# Patient Record
Sex: FEMALE | Race: BLACK OR AFRICAN AMERICAN | NOT HISPANIC OR LATINO | URBAN - METROPOLITAN AREA
[De-identification: names, ages, dates, MRNs, and addresses within clinical notes are randomized per-mention and may not be internally consistent; named-entity substitution may affect disease eponyms.]

---

## 2020-07-06 ENCOUNTER — INPATIENT (INPATIENT)
Facility: HOSPITAL | Age: 45
LOS: 6 days | Discharge: HOME | End: 2020-07-13
Attending: OBSTETRICS & GYNECOLOGY | Admitting: OBSTETRICS & GYNECOLOGY
Payer: MEDICAID

## 2020-07-06 VITALS
OXYGEN SATURATION: 99 % | TEMPERATURE: 99 F | DIASTOLIC BLOOD PRESSURE: 107 MMHG | HEART RATE: 119 BPM | SYSTOLIC BLOOD PRESSURE: 156 MMHG | RESPIRATION RATE: 16 BRPM

## 2020-07-06 LAB
ALBUMIN SERPL ELPH-MCNC: 4.4 G/DL — SIGNIFICANT CHANGE UP (ref 3.3–5)
ALP SERPL-CCNC: 56 U/L — SIGNIFICANT CHANGE UP (ref 40–120)
ALT FLD-CCNC: 12 U/L — SIGNIFICANT CHANGE UP (ref 4–33)
ANION GAP SERPL CALC-SCNC: 13 MMO/L — SIGNIFICANT CHANGE UP (ref 7–14)
APPEARANCE UR: CLEAR — SIGNIFICANT CHANGE UP
AST SERPL-CCNC: 20 U/L — SIGNIFICANT CHANGE UP (ref 4–32)
BACTERIA # UR AUTO: NEGATIVE — SIGNIFICANT CHANGE UP
BASOPHILS # BLD AUTO: 0.04 K/UL — SIGNIFICANT CHANGE UP (ref 0–0.2)
BASOPHILS NFR BLD AUTO: 0.5 % — SIGNIFICANT CHANGE UP (ref 0–2)
BILIRUB SERPL-MCNC: 0.3 MG/DL — SIGNIFICANT CHANGE UP (ref 0.2–1.2)
BILIRUB UR-MCNC: NEGATIVE — SIGNIFICANT CHANGE UP
BLOOD UR QL VISUAL: NEGATIVE — SIGNIFICANT CHANGE UP
BUN SERPL-MCNC: 27 MG/DL — HIGH (ref 7–23)
CALCIUM SERPL-MCNC: 10.1 MG/DL — SIGNIFICANT CHANGE UP (ref 8.4–10.5)
CHLORIDE SERPL-SCNC: 104 MMOL/L — SIGNIFICANT CHANGE UP (ref 98–107)
CO2 SERPL-SCNC: 21 MMOL/L — LOW (ref 22–31)
COLOR SPEC: COLORLESS — SIGNIFICANT CHANGE UP
CREAT SERPL-MCNC: 2.36 MG/DL — HIGH (ref 0.5–1.3)
EOSINOPHIL # BLD AUTO: 0.24 K/UL — SIGNIFICANT CHANGE UP (ref 0–0.5)
EOSINOPHIL NFR BLD AUTO: 3.2 % — SIGNIFICANT CHANGE UP (ref 0–6)
GLUCOSE SERPL-MCNC: 89 MG/DL — SIGNIFICANT CHANGE UP (ref 70–99)
GLUCOSE UR-MCNC: NEGATIVE — SIGNIFICANT CHANGE UP
HCT VFR BLD CALC: 31.4 % — LOW (ref 34.5–45)
HGB BLD-MCNC: 10 G/DL — LOW (ref 11.5–15.5)
HYALINE CASTS # UR AUTO: NEGATIVE — SIGNIFICANT CHANGE UP
IMM GRANULOCYTES NFR BLD AUTO: 0.3 % — SIGNIFICANT CHANGE UP (ref 0–1.5)
KETONES UR-MCNC: NEGATIVE — SIGNIFICANT CHANGE UP
LEUKOCYTE ESTERASE UR-ACNC: SIGNIFICANT CHANGE UP
LYMPHOCYTES # BLD AUTO: 2.08 K/UL — SIGNIFICANT CHANGE UP (ref 1–3.3)
LYMPHOCYTES # BLD AUTO: 28 % — SIGNIFICANT CHANGE UP (ref 13–44)
MAGNESIUM SERPL-MCNC: 1.7 MG/DL — SIGNIFICANT CHANGE UP (ref 1.6–2.6)
MCHC RBC-ENTMCNC: 24.8 PG — LOW (ref 27–34)
MCHC RBC-ENTMCNC: 31.8 % — LOW (ref 32–36)
MCV RBC AUTO: 77.7 FL — LOW (ref 80–100)
MONOCYTES # BLD AUTO: 0.63 K/UL — SIGNIFICANT CHANGE UP (ref 0–0.9)
MONOCYTES NFR BLD AUTO: 8.5 % — SIGNIFICANT CHANGE UP (ref 2–14)
NEUTROPHILS # BLD AUTO: 4.41 K/UL — SIGNIFICANT CHANGE UP (ref 1.8–7.4)
NEUTROPHILS NFR BLD AUTO: 59.5 % — SIGNIFICANT CHANGE UP (ref 43–77)
NITRITE UR-MCNC: NEGATIVE — SIGNIFICANT CHANGE UP
NRBC # FLD: 0 K/UL — SIGNIFICANT CHANGE UP (ref 0–0)
PH UR: 6 — SIGNIFICANT CHANGE UP (ref 5–8)
PHOSPHATE SERPL-MCNC: 4.3 MG/DL — SIGNIFICANT CHANGE UP (ref 2.5–4.5)
PLATELET # BLD AUTO: 248 K/UL — SIGNIFICANT CHANGE UP (ref 150–400)
PMV BLD: 10.2 FL — SIGNIFICANT CHANGE UP (ref 7–13)
POTASSIUM SERPL-MCNC: 3.7 MMOL/L — SIGNIFICANT CHANGE UP (ref 3.5–5.3)
POTASSIUM SERPL-SCNC: 3.7 MMOL/L — SIGNIFICANT CHANGE UP (ref 3.5–5.3)
PROT SERPL-MCNC: 8.1 G/DL — SIGNIFICANT CHANGE UP (ref 6–8.3)
PROT UR-MCNC: NEGATIVE — SIGNIFICANT CHANGE UP
RBC # BLD: 4.04 M/UL — SIGNIFICANT CHANGE UP (ref 3.8–5.2)
RBC # FLD: 14.5 % — SIGNIFICANT CHANGE UP (ref 10.3–14.5)
RBC CASTS # UR COMP ASSIST: SIGNIFICANT CHANGE UP (ref 0–?)
SODIUM SERPL-SCNC: 138 MMOL/L — SIGNIFICANT CHANGE UP (ref 135–145)
SP GR SPEC: 1.01 — SIGNIFICANT CHANGE UP (ref 1–1.04)
SQUAMOUS # UR AUTO: SIGNIFICANT CHANGE UP
UROBILINOGEN FLD QL: NORMAL — SIGNIFICANT CHANGE UP
WBC # BLD: 7.42 K/UL — SIGNIFICANT CHANGE UP (ref 3.8–10.5)
WBC # FLD AUTO: 7.42 K/UL — SIGNIFICANT CHANGE UP (ref 3.8–10.5)
WBC UR QL: HIGH (ref 0–?)

## 2020-07-06 PROCEDURE — 76770 US EXAM ABDO BACK WALL COMP: CPT | Mod: 26

## 2020-07-06 RX ORDER — LIDOCAINE 4 G/100G
1 CREAM TOPICAL ONCE
Refills: 0 | Status: COMPLETED | OUTPATIENT
Start: 2020-07-06 | End: 2020-07-06

## 2020-07-06 RX ADMIN — LIDOCAINE 1 PATCH: 4 CREAM TOPICAL at 22:57

## 2020-07-06 NOTE — ED PROVIDER NOTE - CLINICAL SUMMARY MEDICAL DECISION MAKING FREE TEXT BOX
44F presenting from Pcp after had kidney US showing bilateral hydro in the context of 2 weeks bilat lower ext swelling. No sob, clear lungs on exam, abd with mass palpated. Vitals reassuring. Possibly fibroids vs UTI (treated 1.5 weeks ago) vs cervical ca. Low suspicion for cord compression (patient with low post void residual on POCUS), no leg weakness/ no saddle anesthesia. Will do ua, u preg, labs, kidney us, CT ab non con looking for mass.

## 2020-07-06 NOTE — ED PROVIDER NOTE - PROGRESS NOTE DETAILS
Manisha, PGY2: talked to Dr. Jones, pts Nephrologist, pts baseline Cr 1.1, was 1.8 a week ago, he requests CT ab/pelvis non-con

## 2020-07-06 NOTE — ED PROVIDER NOTE - ATTENDING CONTRIBUTION TO CARE
DR. BLOCH, ATTENDING MD-  I performed a face to face bedside interview with patient regarding history of present illness, review of symptoms and past medical history. I completed an independent physical exam.  I have discussed patient's plan of care with the resident.  Patient examined well appearing NAd HEENT nml lungs clear, abd firm , large abd mass to above umbilicus. nontender, extrem no edema no spinal tenderness neuro nml DR. BLOCH, ATTENDING MD-  I performed a face to face bedside interview with patient regarding history of present illness, review of symptoms and past medical history. I completed an independent physical exam.  I have discussed patient's plan of care with the resident.  Patient examined well appearing NAd HEENT nml lungs clear, abd firm , large abd mass to above umbilicus. nontender, extrem no edema no spinal tenderness neuro nml.

## 2020-07-06 NOTE — ED PROVIDER NOTE - CARE PLAN
Principal Discharge DX:	Acute kidney injury  Secondary Diagnosis:	Urinary retention  Secondary Diagnosis:	Fibroid

## 2020-07-06 NOTE — ED ADULT TRIAGE NOTE - CHIEF COMPLAINT QUOTE
pt sent in by nephrologist because "both of my kidneys are blocked". pt has renal ultrasound done today and was told to come to ED. Pt states she has been having difficulty urinating.

## 2020-07-06 NOTE — ED PROVIDER NOTE - OBJECTIVE STATEMENT
44F no PMH p/w bilateral lower extremity welling for the past 2 weeks associated with urinary frequency and nocturia. Diagnosed UTI 1.5 weeks ago and finished 7 days of Cephalexin, and just finished week of lasix 40mg. States at night she feels her bladder full but has to manually put pressure suprapubically to void. Had a kidney ultrasound done today showing "blocked kidneys". Patient states sometimes Denies fevers, chills, pain on urination, flank pain. Denies chest pain, palpitations, lightheadedness, sob, orthopnea. Recent pap smear normal 6 months ago. No vaginal discharge/abnormal bleeding.   Patient does endorse back pain over the last year without trauma to the area, denies leg weakness, denies saddle anesthesia, does state that sometimes she feels like it's difficult to make a bowel movement.

## 2020-07-06 NOTE — ED ADULT NURSE NOTE - OBJECTIVE STATEMENT
Raina RN: Pt presents to rm 25 AO4 ambulatory complaining of BL flank pain sent in by PMD for "kidney blockage. Pt states she had increased swelling to BL feet, went to PMD was put on 40mg Lasix x7days with no improvement, had ultrasound done and showed "both kidneys blocked." Pt was sent by PMD. Pt endorses BL flank pain but appears to be resting comfortably, 18g IV placed to R AC labs drawn and sent. Pt endorses gaining ~20lbs over past month with associated dyspnea on exertion. Pt denies any chest pain or diff breathing at this time, appears in no obvious distress and handoff report given to JANEY Dougherty. Raina RN: Pt presents to rm 25 AO4 ambulatory complaining of BL flank pain sent in by PMD for "kidney blockage. Pt states she had increased swelling to BL feet, went to PMD was put on 40mg Lasix x7days with no improvement, had ultrasound done and showed "both kidneys blocked." Pt was sent by PMD. Pt endorses BL flank pain but appears to be resting comfortably, 18g IV placed to R AC labs drawn and sent. Pt endorses gaining ~20lbs over past month with associated dyspnea on exertion. Pt denies any chest pain or diff breathing at this time, appears in no obvious distress and handoff report given to JANEY Dougherty.  Received report from PM RN. Pt is alert and oriented times four. Pt waiting for bed assignement. Pt being evaluated by GYN at this time.  No complaints at this time.    JANEY Bryant

## 2020-07-06 NOTE — ED PROVIDER NOTE - PHYSICAL EXAMINATION
GENERAL: no acute distress, non-toxic appearing  HEENT: normal conjunctiva, oral mucosa moist  CARDIAC: regular rate and regular rhythm, normal S1 and S2, no appreciable murmurs  PULM: clear to ascultation bilaterally, no appreciable crackles, rales, rhonchi, or wheezing  GI: abdomen nondistended, soft, nontender, rectal exam chaperoned by Dr. Bloch:  : no CVA tenderness, no suprapubic tenderness  NEURO: alert and oriented x 3, normal speech, moving all extremities without lateralization  MSK: bilateral lower extremity swelling to prox tib, no calf tenderness/redness  SKIN: no visible rashes  PSYCH: appropriate mood and affect GENERAL: no acute distress, non-toxic appearing  HEENT: normal conjunctiva, oral mucosa moist  CARDIAC: regular rate and regular rhythm, normal S1 and S2, no appreciable murmurs  PULM: clear to ascultation bilaterally, no appreciable crackles, rales, rhonchi, or wheezing  GI: abd slight distended with palpable mass extending to just above umbilicus  : no CVA tenderness, no suprapubic tenderness  NEURO: alert and oriented x 3, normal speech, moving all extremities without lateralization  MSK: bilateral lower extremity swelling to prox tib, no calf tenderness/redness  SKIN: no visible rashes  PSYCH: appropriate mood and affect

## 2020-07-07 DIAGNOSIS — N17.9 ACUTE KIDNEY FAILURE, UNSPECIFIED: ICD-10-CM

## 2020-07-07 DIAGNOSIS — Z29.9 ENCOUNTER FOR PROPHYLACTIC MEASURES, UNSPECIFIED: ICD-10-CM

## 2020-07-07 DIAGNOSIS — D21.9 BENIGN NEOPLASM OF CONNECTIVE AND OTHER SOFT TISSUE, UNSPECIFIED: ICD-10-CM

## 2020-07-07 LAB
ANION GAP SERPL CALC-SCNC: 18 MMO/L — HIGH (ref 7–14)
BUN SERPL-MCNC: 24 MG/DL — HIGH (ref 7–23)
CALCIUM SERPL-MCNC: 9.4 MG/DL — SIGNIFICANT CHANGE UP (ref 8.4–10.5)
CHLORIDE SERPL-SCNC: 104 MMOL/L — SIGNIFICANT CHANGE UP (ref 98–107)
CHLORIDE UR-SCNC: 45 MMOL/L — SIGNIFICANT CHANGE UP
CO2 SERPL-SCNC: 14 MMOL/L — LOW (ref 22–31)
CREAT ?TM UR-MCNC: 45.7 MG/DL — SIGNIFICANT CHANGE UP
CREAT SERPL-MCNC: 2 MG/DL — HIGH (ref 0.5–1.3)
CULTURE RESULTS: SIGNIFICANT CHANGE UP
GLUCOSE SERPL-MCNC: 74 MG/DL — SIGNIFICANT CHANGE UP (ref 70–99)
HCT VFR BLD CALC: 32.5 % — LOW (ref 34.5–45)
HGB BLD-MCNC: 10.3 G/DL — LOW (ref 11.5–15.5)
MAGNESIUM SERPL-MCNC: 1.6 MG/DL — SIGNIFICANT CHANGE UP (ref 1.6–2.6)
MCHC RBC-ENTMCNC: 25.3 PG — LOW (ref 27–34)
MCHC RBC-ENTMCNC: 31.7 % — LOW (ref 32–36)
MCV RBC AUTO: 79.9 FL — LOW (ref 80–100)
NRBC # FLD: 0 K/UL — SIGNIFICANT CHANGE UP (ref 0–0)
PHOSPHATE SERPL-MCNC: 4.3 MG/DL — SIGNIFICANT CHANGE UP (ref 2.5–4.5)
PLATELET # BLD AUTO: 264 K/UL — SIGNIFICANT CHANGE UP (ref 150–400)
PMV BLD: 10.8 FL — SIGNIFICANT CHANGE UP (ref 7–13)
POTASSIUM SERPL-MCNC: 4.7 MMOL/L — SIGNIFICANT CHANGE UP (ref 3.5–5.3)
POTASSIUM SERPL-SCNC: 4.7 MMOL/L — SIGNIFICANT CHANGE UP (ref 3.5–5.3)
POTASSIUM UR-SCNC: 14.4 MMOL/L — SIGNIFICANT CHANGE UP
RBC # BLD: 4.07 M/UL — SIGNIFICANT CHANGE UP (ref 3.8–5.2)
RBC # FLD: 14.7 % — HIGH (ref 10.3–14.5)
SARS-COV-2 IGG SERPL QL IA: NEGATIVE — SIGNIFICANT CHANGE UP
SARS-COV-2 IGM SERPL IA-ACNC: <0.1 INDEX — SIGNIFICANT CHANGE UP
SARS-COV-2 RNA SPEC QL NAA+PROBE: SIGNIFICANT CHANGE UP
SODIUM SERPL-SCNC: 136 MMOL/L — SIGNIFICANT CHANGE UP (ref 135–145)
SODIUM UR-SCNC: 61 MMOL/L — SIGNIFICANT CHANGE UP
SPECIMEN SOURCE: SIGNIFICANT CHANGE UP
WBC # BLD: 7.87 K/UL — SIGNIFICANT CHANGE UP (ref 3.8–10.5)
WBC # FLD AUTO: 7.87 K/UL — SIGNIFICANT CHANGE UP (ref 3.8–10.5)

## 2020-07-07 PROCEDURE — 74176 CT ABD & PELVIS W/O CONTRAST: CPT | Mod: 26

## 2020-07-07 PROCEDURE — 99223 1ST HOSP IP/OBS HIGH 75: CPT

## 2020-07-07 RX ORDER — SODIUM CHLORIDE 9 MG/ML
1000 INJECTION, SOLUTION INTRAVENOUS
Refills: 0 | Status: DISCONTINUED | OUTPATIENT
Start: 2020-07-08 | End: 2020-07-08

## 2020-07-07 RX ORDER — SODIUM BICARBONATE 1 MEQ/ML
650 SYRINGE (ML) INTRAVENOUS THREE TIMES A DAY
Refills: 0 | Status: DISCONTINUED | OUTPATIENT
Start: 2020-07-07 | End: 2020-07-09

## 2020-07-07 RX ADMIN — LIDOCAINE 1 PATCH: 4 CREAM TOPICAL at 14:12

## 2020-07-07 RX ADMIN — Medication 650 MILLIGRAM(S): at 22:12

## 2020-07-07 NOTE — CONSULT NOTE ADULT - ASSESSMENT
44yoF with b/l hydronephrosis, EDIE 2/2 massively enlarged uterus    -Patient obstructed w/EDIE 2/2 massively enlarged uterus  -Placement of ureteral stents highly unlikely to be successful given significant extrinsic compression from uterus  -Even if ureteral stents were placed, there is a high likelihood of early stent failure, again secondary to extrinsic compression  -Recommend IR consult for bilateral nephrostomy tube placement 44yoF with b/l hydronephrosis, EDIE 2/2 massively enlarged uterus    -Patient obstructed w/EDIE 2/2 massively enlarged uterus  -Placement of ureteral stents highly unlikely to be successful given significant extrinsic compression from uterus  -Even if ureteral stents were placed, there is a high likelihood of early stent failure, again secondary to extrinsic compression  -Recommend bilateral nephrostomy tube placement by IR  -Discussed with patient, patient not currently amenable to nephrostomy tubes  -Obstruction and need for drainage likely to resolve with treatment of fibroid uterus  -Will follow up with GYN regarding surgical plan 44yoF with b/l hydronephrosis, EDIE 2/2 massively enlarged uterus    -Patient obstructed w/EDIE 2/2 massively enlarged uterus  -Placement of ureteral stents highly unlikely to be successful given significant extrinsic compression from uterus  -Even if ureteral stents were placed, there is a high likelihood of early stent failure, again secondary to extrinsic compression  -Recommend bilateral nephrostomy tube placement by IR  -Discussed with patient, patient not currently amenable to nephrostomy tubes  -Obstruction and need for drainage likely to resolve with treatment of fibroid uterus  -Will follow up with GYN regarding surgical plan  -For now, recommend placement of Parkinson catheter to monitor UOP given bilateral obstruction

## 2020-07-07 NOTE — CONSULT NOTE ADULT - ASSESSMENT
44 year old woman with no significant pmhx who presents to the ED at request of her nephrologist for EDIE/hydronephrosis likely related to fibroid uterus.    EDIE  Scr 1.08 1 year ago  EDIE likely sec to obstruction  CT with b/l hydro  f/u  and GYN  monitor bmp  avoid nephrotoxic agents    Elevated BP  not on meds at home  currently ~ 150  monitor.   consider norvasc 5 if bp persistently >150    Acidosis  AG  ? etiology  bicarb 650 tid x 2 days  monitor 44 year old woman with no significant pmhx who presents to the ED at request of her nephrologist for EDIE/hydronephrosis likely related to fibroid uterus.    EDIE  Scr 1.08 1 year ago  EDIE likely sec to obstruction  CT with b/l hydro  f/u  and GYN  cleared from renal stand point for surgery with anesthesia   monitor bmp  avoid nephrotoxic agents    Elevated BP  not on meds at home  currently ~ 150  monitor.   consider norvasc 5 if bp persistently >150    Acidosis  AG  ? etiology  bicarb 650 tid x 2 days  monitor 44 year old woman with no significant pmhx who presents to the ED at request of her nephrologist for EDIE/hydronephrosis likely related to fibroid uterus.    EDIE  Scr 1.08 1 year ago  EDIE likely sec to obstruction  CT with b/l hydro  f/u  and GYN  cleared from renal stand point for surgery with anesthesia   monitor bmp  avoid nephrotoxic agents    Elevated BP without diagnosis of HTN   not on meds at home  currently ~ 150  monitor.   consider norvasc 5mg daily if bp persistently >150    Acidosis  AG  ? etiology  bicarb 650 tid x 2 days  monitor

## 2020-07-07 NOTE — H&P ADULT - NSHPLABSRESULTS_GEN_ALL_CORE
138  |  104  |  27<H>  ----------------------------<  89  3.7   |  21<L>  |  2.36<H>    Ca    10.1      2020 21:41  Phos  4.3     -  Mg     1.7         TPro  8.1  /  Alb  4.4  /  TBili  0.3  /  DBili  x   /  AST  20  /  ALT  12  /  AlkPhos  56  -                            10.0   7.42  )-----------( 248      ( 2020 21:41 )             31.4             LIVER FUNCTIONS - ( 2020 21:41 )  Alb: 4.4 g/dL / Pro: 8.1 g/dL / ALK PHOS: 56 u/L / ALT: 12 u/L / AST: 20 u/L / GGT: x                 Urinalysis Basic - ( 2020 21:41 )    Color: COLORLESS / Appearance: CLEAR / S.010 / pH: 6.0  Gluc: NEGATIVE / Ketone: NEGATIVE  / Bili: NEGATIVE / Urobili: NORMAL   Blood: NEGATIVE / Protein: NEGATIVE / Nitrite: NEGATIVE   Leuk Esterase: SMALL / RBC: 0-2 / WBC 6-10   Sq Epi: OCC / Non Sq Epi: x / Bacteria: NEGATIVE    CT abdomen/pelvis: IMPRESSION:   Severe bilateral hydronephrosis secondary to massively enlarged uterus, presumably related to the presence of fibroids.    US kidney and bladder: IMPRESSION:   Severe bilateral hydronephrosis.

## 2020-07-07 NOTE — CHART NOTE - NSCHARTNOTEFT_GEN_A_CORE
R3 Gyn Chart Note    Patient seen at bedside with Gyn team. Patient counseled on R/B/A to hysterectomy. Patient agrees to surgical management at this time.    - Patient consented for Total Abdominal Hysterectomy Bilateral Salpingectomy, Exam under anesthesia, removal of damaged or diseased tissue, and possible bilateral oopherectomy. All questions answered to patient's apparent satisfaction.  - Patient scheduled for OR 7/8 at 12p  - Records from outside gyn pending. Repeat call x 4 placed today to request records.  - NPO/IVF at midnight  - Hold HSQ overnight  - Nephrology and medical clearance reviewed, recs appreciated    d/w Dr. Mak Zelaya PGY3 R3 Gyn Chart Note    Patient seen at bedside with Gyn team. Patient counseled on R/B/A to hysterectomy. Patient agrees to surgical management at this time.    - Patient consented for Total Abdominal Hysterectomy Bilateral Salpingectomy, Exam under anesthesia, removal of damaged or diseased tissue, and possible bilateral oopherectomy. All questions answered to patient's apparent satisfaction.  - Patient scheduled for OR 7/8 at 12p  - Outside Gyn reviewed records verbally--PAP 8/2019 NILM, HPV 18+. Will fax copy to [facility] to place in patient's chart   - NPO/IVF at midnight  - Hold HSQ overnight  - Nephrology and medical clearance reviewed, recs appreciated    d/w Dr. Mak Zelaay PGY3

## 2020-07-07 NOTE — CONSULT NOTE ADULT - SUBJECTIVE AND OBJECTIVE BOX
MAURICIO PERLA  44y  Female [id number]    HPI: 43 yo  presenting to ED w/ EDIE secondary to fibroid uterus compressing ureters bilaterally causing hydronephrosis.  Patient noticed lower extremity edema 1 wk, was seen by her PCP who prescribed lasix and referred to nephrology.  Further nephrology workup included renal ultrasound that subsequently showed bilateral hydronephrosis w/ EDIE, and patient was referred to ED.  Pt denies no known hx of fibroids.  Describes periods as occurring regularly, last 4-5 days, using 3 pads/day. Occasionally has intermenstrual spotting.  Menses are non-painful.  Otherwise asymptomatic, denies chest pain, SOB, fever, chills, nausea/vomiting.    Name of GYN Physician: pt does not recall name    POB:  MAB x1 w/ D&C    Pgyn: Denies known fibroids, cysts, endometriosis, abnormal paps.  Hx of chlamydia at age 18.    Home meds: none    Allergies    No Known Allergies    PAST MEDICAL & SURGICAL HISTORY:  sinusitis  liposuction     Social History:  Denies smoking use, drug use, alcohol use.       Vital Signs Last 24 Hrs  T(C): 37.1 (2020 23:59), Max: 37.2 (2020 18:02)  T(F): 98.8 (2020 23:59), Max: 98.9 (2020 18:02)  HR: 86 (2020 23:59) (86 - 119)  BP: 159/97 (2020 23:59) (156/107 - 159/97)  BP(mean): --  RR: 17 (2020 23:59) (16 - 17)  SpO2: 100% (2020 23:59) (99% - 100%)    Physical Exam:   General: sitting comftorably in bed, NAD   HEENT: neck supple, full ROM  CV: RR S1S2 no m/r/g  Lungs: CTA b/l, good air flow b/l   Back: No CVA tenderness  Abd: Soft, non-tender, non-distended.  Bowel sounds present.    :  No bleeding on pad.    External labia wnl.  Bimanual exam with no cervical motion tenderness, uterus wnl, adnexa non palpable b/l.  Cervix closed vs. Cervix dilated    cm   Speculum Exam: No active bleeding from os.  Physiologic discharge.    Ext: non-tender b/l, no edema     LABS:                          10.0   7.42  )-----------( 248      ( 2020 21:41 )             31.4     07-06    138  |  104  |  27<H>  ----------------------------<  89  3.7   |  21<L>  |  2.36<H>    Ca    10.1      2020 21:41  Phos  4.3     07-  Mg     1.7     07-    TPro  8.1  /  Alb  4.4  /  TBili  0.3  /  DBili  x   /  AST  20  /  ALT  12  /  AlkPhos  56  07-06    I&O's Detail      Urinalysis Basic - ( 2020 21:41 )    Color: COLORLESS / Appearance: CLEAR / S.010 / pH: 6.0  Gluc: NEGATIVE / Ketone: NEGATIVE  / Bili: NEGATIVE / Urobili: NORMAL   Blood: NEGATIVE / Protein: NEGATIVE / Nitrite: NEGATIVE   Leuk Esterase: SMALL / RBC: 0-2 / WBC 6-10   Sq Epi: OCC / Non Sq Epi: x / Bacteria: NEGATIVE    RADIOLOGY & ADDITIONAL STUDIES:  < from: CT Abdomen and Pelvis No Cont (20 @ 01:18) >  EXAM:  CT ABDOMEN AND PELVIS        PROCEDURE DATE:  2020     INTERPRETATION:  CLINICAL INFORMATION: Bilateral hydronephrosis, masses palpated, acute kidney injury    COMPARISON: Ultrasound kidneys 2020    PROCEDURE:   CT of the Abdomen and Pelvis was performed without intravenous contrast.   Intravenous contrast: None.  Oral contrast: None.  Sagittal and coronal reformats were performed.    FINDINGS:  LOWER CHEST: Within normal limits.    LIVER: Within normal limits.  BILE DUCTS: Normal caliber.  GALLBLADDER: Within normal limits.  SPLEEN: Within normal limits.  PANCREAS: Within normal limits.  ADRENALS: Within normal limits.  KIDNEYS/URETERS/REPRODUCTIVE ORGANS: Severe bilateral hydronephrosis secondary to extensive myomatous uterus. Uterus is lobulated and measures 19.8 x 23.8 x 12.1 cm. Largest fibroid is inferior and measures approximately 11.5 x 11.4 x 11.0 cm.    BLADDER: Within normal limits.    BOWEL: No bowel obstruction. Appendix is normal.  PERITONEUM: No ascites.  VESSELS: Within normal limits.  RETROPERITONEUM/LYMPH NODES: No lymphadenopathy.    ABDOMINAL WALL: Within normal limits.  BONES: Within normal limits.    IMPRESSION:   Severe bilateral hydronephrosis secondary to massively enlarged uterus, presumably related to the presence of fibroids.    YVONNE LIGHT M.D., RADIOLOGY RESIDENT  This document has been electronically signed.  JULIÁN NAVARRETE   This document has been electronically signed. 2020  1:51AM MAURICIO PERLA  44y  Female [id number]    HPI: 43 yo  sent in to ED by her nephrologist Dr. Jones for admission for new EDIE secondary to fibroid uterus compressing ureters bilaterally causing hydronephrosis.  Pt initially presented to her PCP ~1-2 wks ago with urinary frequency/nocturia and b/l LE swelling.  Her PCP prescribed keflex for presumed UTI and lasix and referred to nephrology, Dr. Jones.  Further nephrology workup showed EDIE (Cr increased from baseline 1.1 to 1.8) and bilateral hydronephrosis seen on outpatient renal ultrasound.  In ED, CTAP shows enlarged multifibroid uterus extending to just below the sternum.  Currently pt denies pain but continues to report urinary frequency.  She denies known hx of fibroids.  Describes periods as occurring regularly, last 4-5 days, using 3 pads/day. Occasionally has intermenstrual spotting.  Menses are non-painful.  Otherwise asymptomatic, denies chest pain, SOB, fever, chills, nausea/vomiting.    Primary GYN: pt does not recall name, @ [facility]  OBH:  MAB x1 w/ D&C  GYNH: Denies known fibroids, cysts, endometriosis, abnormal paps.  Hx of chlamydia at age 18.  PMSH: sinusitis, liposuction 2018  MEDs: none  ALL: NKDA    Vital Signs Last 24 Hrs  T(C): 37.1 (2020 23:59), Max: 37.2 (2020 18:02)  T(F): 98.8 (2020 23:59), Max: 98.9 (2020 18:02)  HR: 86 (2020 23:59) (86 - 119)  BP: 159/97 (2020 23:59) (156/107 - 159/97)  RR: 17 (2020 23:59) (16 - 17)  SpO2: 100% (2020 23:59) (99% - 100%)    Physical Exam:   General: sitting comfortably in bed, NAD   CV: RRR  Lungs: CTAB  Abd: Soft, non-tender, distended with large firm pelvic mass  Pelvic: deferred (pt in hallway)  Ext: non-tender b/l, no edema     LABS:    Electrolytes, Urine (07.07.20 @ 03:10)    Sodium, Random Urine: 61: Reference range not established for this test mmol/L    Potassium, Random Urine: 14.4: Reference range not established for this test mmol/L    Chloride, Random Urine: 45: Reference range not established for this test mmol/L                     10.0   7.42  )-----------( 248      ( 2020 21:41 )             31.4     07-06    138  |  104  |  27<H>  ----------------------------<  89  3.7   |  21<L>  |  2.36<H>    Ca    10.1      2020 21:41  Phos  4.3     07-  Mg     1.7     07-06    TPro  8.1  /  Alb  4.4  /  TBili  0.3  /  DBili  x   /  AST  20  /  ALT  12  /  AlkPhos  56  07-06    Urinalysis Basic - ( 2020 21:41 )  Color: COLORLESS / Appearance: CLEAR / S.010 / pH: 6.0  Gluc: NEGATIVE / Ketone: NEGATIVE  / Bili: NEGATIVE / Urobili: NORMAL   Blood: NEGATIVE / Protein: NEGATIVE / Nitrite: NEGATIVE   Leuk Esterase: SMALL / RBC: 0-2 / WBC 6-10   Sq Epi: OCC / Non Sq Epi: x / Bacteria: NEGATIVE    RADIOLOGY & ADDITIONAL STUDIES:    < from: CT Abdomen and Pelvis No Cont (20 @ 01:18) >  EXAM:  CT ABDOMEN AND PELVIS    PROCEDURE DATE:  2020   INTERPRETATION:  CLINICAL INFORMATION: Bilateral hydronephrosis, masses palpated, acute kidney injury  COMPARISON: Ultrasound kidneys 2020  PROCEDURE:  CT of the Abdomen and Pelvis was performed without intravenous contrast.  Intravenous contrast: None. Oral contrast: None. Sagittal and coronal reformats were performed.  FINDINGS: LOWER CHEST: Within normal limits.  LIVER: Within normal limits.  BILE DUCTS: Normal caliber.  GALLBLADDER: Within normal limits.  SPLEEN: Within normal limits.  PANCREAS: Within normal limits.  ADRENALS: Within normal limits.  KIDNEYS/URETERS/REPRODUCTIVE ORGANS: Severe bilateral hydronephrosis secondary to extensive myomatous uterus. Uterus is lobulated and measures 19.8 x 23.8 x 12.1 cm. Largest fibroid is inferior and measures approximately 11.5 x 11.4 x 11.0 cm.  BLADDER: Within normal limits.  BOWEL: No bowel obstruction. Appendix is normal.  PERITONEUM: No ascites.  VESSELS: Within normal limits.  RETROPERITONEUM/LYMPH NODES: No lymphadenopathy.    ABDOMINAL WALL: Within normal limits.  BONES: Within normal limits.    IMPRESSION: Severe bilateral hydronephrosis secondary to massively enlarged uterus, presumably related to the presence of fibroids.    YVONNE LIGHT M.D., RADIOLOGY RESIDENT  This document has been electronically signed.  JULIÁN NAVARRETE   This document has been electronically signed. 2020  1:51AM

## 2020-07-07 NOTE — CONSULT NOTE ADULT - SUBJECTIVE AND OBJECTIVE BOX
HPI: 44 yoF sent in to ED by her nephrologist Dr. Jones for admission for new EDIE secondary to fibroid uterus compressing ureters bilaterally causing hydronephrosis.  Pt initially presented to her PCP ~1-2 wks ago with urinary frequency/nocturia and b/l LE swelling.  Her PCP prescribed keflex for presumed UTI and lasix and referred to nephrology, Dr. Jones.  Further nephrology workup showed EDIE (Cr increased from baseline 1.1 to 1.8) and bilateral hydronephrosis seen on outpatient renal ultrasound.      In ED, CTAP shows enlarged multifibroid uterus extending to just below the sternum.  Currently pt denies pain but continues to report urinary frequency.  She denies known hx of fibroids.  Describes periods as occurring regularly, last 4-5 days, using 3 pads/day. Occasionally has intermenstrual spotting.  Menses are non-painful.  Otherwise asymptomatic, denies chest pain, SOB, fever, chills, nausea/vomiting.    PAST MEDICAL & SURGICAL HISTORY:  No pertinent past medical history  No significant past surgical history    FAMILY HISTORY:  Family history of uterine fibroid    No Known Allergies    REVIEW OF SYSTEMS: Pertinent positives and negatives as stated in HPI, otherwise negative    Vital signs  T(C): 36.7, Max: 37.2 ( @ 18:02)  HR: 89  BP: 142/95  SpO2: 100%    Physical Exam  Incomplete    LABS:   @ 21:41    WBC 7.42  / Hct 31.4  / SCr 2.36         138  |  104  |  27<H>  ----------------------------<  89  3.7   |  21<L>  |  2.36<H>    Ca    10.1      2020 21:41  Phos  4.3     -  Mg     1.7     -    TPro  8.1  /  Alb  4.4  /  TBili  0.3  /  DBili  x   /  AST  20  /  ALT  12  /  AlkPhos  56        Urinalysis Basic - ( 2020 21:41 )  Color: COLORLESS / Appearance: CLEAR / S.010 / pH: 6.0  Gluc: NEGATIVE / Ketone: NEGATIVE  / Bili: NEGATIVE / Urobili: NORMAL   Blood: NEGATIVE / Protein: NEGATIVE / Nitrite: NEGATIVE   Leuk Esterase: SMALL / RBC: 0-2 / WBC 6-10   Sq Epi: OCC / Non Sq Epi: x / Bacteria: NEGATIVE    Urine Cx: p    RADIOLOGY:  < from: CT Abdomen and Pelvis No Cont (20 @ 01:18) >    ADRENALS: Within normal limits.  KIDNEYS/URETERS/REPRODUCTIVE ORGANS: Severe bilateral hydronephrosis secondary to extensive myomatous uterus. Uterus is lobulated and measures 19.8 x 23.8 x 12.1 cm. Largest fibroid is inferior and measures approximately 11.5 x 11.4 x 11.0 cm.  BLADDER: Within normal limits.  BOWEL: No bowel obstruction. Appendix is normal.  PERITONEUM: No ascites.  VESSELS: Within normal limits.  RETROPERITONEUM/LYMPH NODES: No lymphadenopathy.    ABDOMINAL WALL: Within normal limits.  BONES: Within normal limits.    IMPRESSION:   Severe bilateral hydronephrosis secondary to massively enlarged uterus, presumably related to the presence of fibroids. HPI: 44 yoF sent in to ED by her nephrologist Dr. Jones for admission for new EDIE secondary to fibroid uterus compressing ureters bilaterally causing hydronephrosis.  Pt initially presented to her PCP ~1-2 wks ago with urinary frequency/nocturia and b/l LE swelling.  Her PCP prescribed keflex for presumed UTI and lasix and referred to nephrology, Dr. Jones.  Further nephrology workup showed EDIE (Cr increased from baseline 1.1 to 1.8) and bilateral hydronephrosis seen on outpatient renal ultrasound.      In ED, CTAP shows enlarged multifibroid uterus extending to just below the sternum.  Currently pt denies pain but continues to report urinary frequency.  She denies known hx of fibroids.  Describes periods as occurring regularly, last 4-5 days, using 3 pads/day. Occasionally has intermenstrual spotting.  Menses are non-painful.  Otherwise asymptomatic, denies chest pain, SOB, fever, chills, nausea/vomiting.    PAST MEDICAL & SURGICAL HISTORY:  No pertinent past medical history  No significant past surgical history    FAMILY HISTORY:  Family history of uterine fibroid    No Known Allergies    REVIEW OF SYSTEMS: Pertinent positives and negatives as stated in HPI, otherwise negative    Vital signs  T(C): 36.7, Max: 37.2 ( @ 18:02)  HR: 89  BP: 142/95  SpO2: 100%    Physical Exam  Gen: NAD  Abd: Enlarged uterus  : No CVAT    LABS:   @ 21:41    WBC 7.42  / Hct 31.4  / SCr 2.36       138  |  104  |  27<H>  ----------------------------<  89  3.7   |  21<L>  |  2.36<H>    Ca    10.1      2020 21:41  Phos  4.3     -  Mg     1.7     -    TPro  8.1  /  Alb  4.4  /  TBili  0.3  /  DBili  x   /  AST  20  /  ALT  12  /  AlkPhos  56      Urinalysis Basic - ( 2020 21:41 )  Color: COLORLESS / Appearance: CLEAR / S.010 / pH: 6.0  Gluc: NEGATIVE / Ketone: NEGATIVE  / Bili: NEGATIVE / Urobili: NORMAL   Blood: NEGATIVE / Protein: NEGATIVE / Nitrite: NEGATIVE   Leuk Esterase: SMALL / RBC: 0-2 / WBC 6-10   Sq Epi: OCC / Non Sq Epi: x / Bacteria: NEGATIVE    Urine Cx: p    RADIOLOGY:  < from: CT Abdomen and Pelvis No Cont (20 @ 01:18) >    ADRENALS: Within normal limits.  KIDNEYS/URETERS/REPRODUCTIVE ORGANS: Severe bilateral hydronephrosis secondary to extensive myomatous uterus. Uterus is lobulated and measures 19.8 x 23.8 x 12.1 cm. Largest fibroid is inferior and measures approximately 11.5 x 11.4 x 11.0 cm.  BLADDER: Within normal limits.  BOWEL: No bowel obstruction. Appendix is normal.  PERITONEUM: No ascites.  VESSELS: Within normal limits.  RETROPERITONEUM/LYMPH NODES: No lymphadenopathy.    ABDOMINAL WALL: Within normal limits.  BONES: Within normal limits.    IMPRESSION:   Severe bilateral hydronephrosis secondary to massively enlarged uterus, presumably related to the presence of fibroids.

## 2020-07-07 NOTE — CONSULT NOTE ADULT - SUBJECTIVE AND OBJECTIVE BOX
[facility]   MD AUDREY TOVAR DO ANAM SIDDIQUI ANGELA WONG, PA    TEL:  OFFICE: [phone]  DR ENCARNACION CELL: [phone]  DR. JOE CELL: [phone]  DR. CHA CELL: [phone]  MARY HANSEN CELL: [phone]    From 5pm-7am answering service [phone]    HPI:  44 year old woman with no significant pmhx who presents to the ED at request of her nephrologist for EDIE/hydronephrosis likely related to fibroid uterus. patient seen at the office few weeks ago referred by PCP for EDIE scr worsen from 1.08 to 1.8 in one year. US done as noted. currently pt denied sob, cp, abd pain, n/v/d. Patient did note LE edema improves with elevated, increase frequency of urination and weight gain.       Allergies:  No Known Allergies      PAST MEDICAL & SURGICAL HISTORY:  No pertinent past medical history  No significant past surgical history      Home Medications Reviewed    Hospital Medications:   MEDICATIONS  (STANDING):      SOCIAL HISTORY:  Denies ETOh, Smoking,     FAMILY HISTORY:  Family history of uterine fibroid      REVIEW OF SYSTEMS:  CONSTITUTIONAL: No weakness, fevers or chills  EYES/ENT: No visual changes;  No vertigo or throat pain   NECK: No pain or stiffness  RESPIRATORY: No cough, wheezing, hemoptysis; No shortness of breath  CARDIOVASCULAR: No chest pain or palpitations.  GASTROINTESTINAL: No abdominal or epigastric pain. No nausea, vomiting, or hematemesis; No diarrhea or constipation. No melena or hematochezia.  GENITOURINARY: see hpi  NEUROLOGICAL: No numbness or weakness  SKIN: No itching, burning, rashes, or lesions   VASCULAR: + bilateral lower extremity edema.   All other review of systems is negative unless indicated above.    VITALS:  T(F): 98.6 (20 @ 11:29), Max: 98.9 (20 @ 18:02)  HR: 102 (20 @ :29)  BP: 156/109 (20 @ 11:29)  RR: 16 (20 @ 11:29)  SpO2: 100% (20 @ 11:29)  Wt(kg): --        PHYSICAL EXAM:  Constitutional: NAD  HEENT: anicteric sclera, oropharynx clear, MMM  Neck: No JVD  Respiratory: CTAB, no wheezes, rales or rhonchi  Cardiovascular: S1, S2, RRR  Gastrointestinal: BS+, soft, NT/ND  Extremities: trace peripheral edema  Neurological: A/O x 3, no focal deficits  Psychiatric: Normal mood, normal affect  : No CVA tenderness. No cruz.   Skin: No rashes      LABS:      136  |  104  |  24<H>  ----------------------------<  74  4.7   |  14<L>  |  2.00<H>    Ca    9.4      2020 07:00  Phos  4.3       Mg     1.6         TPro  8.1  /  Alb  4.4  /  TBili  0.3  /  DBili      /  AST  20  /  ALT  12  /  AlkPhos  56      Creatinine Trend: 2.00 <--, 2.36 <--                        10.0   7.42  )-----------( 248      ( 2020 21:41 )             31.4     Urine Studies:  Urinalysis Basic - ( 2020 21:41 )    Color: COLORLESS / Appearance: CLEAR / S.010 / pH: 6.0  Gluc: NEGATIVE / Ketone: NEGATIVE  / Bili: NEGATIVE / Urobili: NORMAL   Blood: NEGATIVE / Protein: NEGATIVE / Nitrite: NEGATIVE   Leuk Esterase: SMALL / RBC: 0-2 / WBC 6-10   Sq Epi: OCC / Non Sq Epi:  / Bacteria: NEGATIVE      Sodium, Random Urine: 61 mmol/L ( @ 03:10)  Potassium, Random Urine: 14.4 mmol/L ( @ 03:10)  Chloride, Random Urine: 45 mmol/L ( @ 03:10)  Creatinine, Random Urine: 45.70 mg/dL ( @ 03:10)      RADIOLOGY & ADDITIONAL STUDIES:

## 2020-07-07 NOTE — H&P ADULT - ASSESSMENT
44 year old woman with no significant pmhx who presents to the ED at request of her nephrologist for EDIE/hydronephrosis likely related to fibroid uterus. To be admitted for further workup

## 2020-07-07 NOTE — H&P ADULT - HISTORY OF PRESENT ILLNESS
Pt is a 44 year old woman with no significant pmhx who presents to the ED at request of her nephrologist for EDIE/hydronephrosis likely related to fibroid uterus. Pt reports for about 2 weeks having b/l LE edema along with urinary frequency. She also reports 2 months of heavier menstrual cycles with some irregularity at times. She was diagnosed with UTI and given 7 days of cephalexin which she finished. She also was given a week of lasix 40mg which she finished and said helped her edema. She then today had a kidney ultrasound done today which showed severe hydronephrosis and so she was sent to the ED. No other fever, chills, cough, chest pain, shortness of breath, abdominal pain, or dysuria/diarrhea.

## 2020-07-07 NOTE — H&P ADULT - PROBLEM SELECTOR PLAN 2
as above, seen on CT compressing b/l ureters causing hydronephrosis  -gyn consulted, recs appreciated

## 2020-07-07 NOTE — H&P ADULT - PROBLEM SELECTOR PLAN 1
EDIE likely obstructive with severe b/l hydronephosis likely 2/2 large fibroid uterus compressing ureters  -pt currently asymptomatic, gyn consulted, will plan for likely hysterectomy inpt vs outpt  -trend bmp and I/O. Will consult urology this AM for possible stent per gyn

## 2020-07-07 NOTE — CONSULT NOTE ADULT - ASSESSMENT
45 yo  sent in to ED by her nephrologist Dr. Jones for admission for new EDIE secondary to large fibroid uterus compressing ureters bilaterally causing hydronephrosis.     RECOMMENDATIONS  -agree with admission to medicine per Dr. Jones's request  -GYN Team will see pt in AM for pap/emb and surgical planning inpt vs outpt  -recommend Urology consult as pt will likely need ureteral stents    Pt seen and plan d/w Dr. Leeroy Fairbanks MD PGY4

## 2020-07-07 NOTE — H&P ADULT - PROBLEM SELECTOR PLAN 3
SCDs for now given possible procedure, regular diet SCDs for now given possible procedure, renal diet

## 2020-07-07 NOTE — H&P ADULT - NSHPPHYSICALEXAM_GEN_ALL_CORE
PHYSICAL EXAM:  GENERAL: NAD, well-developed, well-nourished  HEAD:  Atraumatic, Normocephalic  EYES: EOMI, PERRLA, conjunctiva and sclera clear  NECK: Supple, No JVD  CHEST/LUNG: Clear to auscultation bilaterally; No wheezes, rales or rhonchi  HEART: Regular rate and rhythm; No murmurs, rubs, or gallops, (+)S1, S2  ABDOMEN: Soft, +mass palpable in midabdomen, Nontender, Nondistended; Normal Bowel sounds, no cva tenderness   EXTREMITIES:  2+ Peripheral Pulses, trace pitting edema b/l  PSYCH: normal mood and affect  NEUROLOGY: AAOx3, non-focal  SKIN: No rashes or lesions

## 2020-07-08 ENCOUNTER — TRANSCRIPTION ENCOUNTER (OUTPATIENT)
Age: 45
End: 2020-07-08

## 2020-07-08 LAB
ANION GAP SERPL CALC-SCNC: 14 MMO/L — SIGNIFICANT CHANGE UP (ref 7–14)
BLD GP AB SCN SERPL QL: NEGATIVE — SIGNIFICANT CHANGE UP
BUN SERPL-MCNC: 21 MG/DL — SIGNIFICANT CHANGE UP (ref 7–23)
CALCIUM SERPL-MCNC: 9.7 MG/DL — SIGNIFICANT CHANGE UP (ref 8.4–10.5)
CHLORIDE SERPL-SCNC: 106 MMOL/L — SIGNIFICANT CHANGE UP (ref 98–107)
CO2 SERPL-SCNC: 19 MMOL/L — LOW (ref 22–31)
CREAT SERPL-MCNC: 2.17 MG/DL — HIGH (ref 0.5–1.3)
GLUCOSE BLDC GLUCOMTR-MCNC: 91 MG/DL — SIGNIFICANT CHANGE UP (ref 70–99)
GLUCOSE SERPL-MCNC: 93 MG/DL — SIGNIFICANT CHANGE UP (ref 70–99)
HCG SERPL-ACNC: < 5 MIU/ML — SIGNIFICANT CHANGE UP
HCT VFR BLD CALC: 31.7 % — LOW (ref 34.5–45)
HGB BLD-MCNC: 10 G/DL — LOW (ref 11.5–15.5)
MCHC RBC-ENTMCNC: 24.7 PG — LOW (ref 27–34)
MCHC RBC-ENTMCNC: 31.5 % — LOW (ref 32–36)
MCV RBC AUTO: 78.3 FL — LOW (ref 80–100)
NRBC # FLD: 0 K/UL — SIGNIFICANT CHANGE UP (ref 0–0)
PLATELET # BLD AUTO: 251 K/UL — SIGNIFICANT CHANGE UP (ref 150–400)
PMV BLD: 9.9 FL — SIGNIFICANT CHANGE UP (ref 7–13)
POTASSIUM SERPL-MCNC: 3.8 MMOL/L — SIGNIFICANT CHANGE UP (ref 3.5–5.3)
POTASSIUM SERPL-SCNC: 3.8 MMOL/L — SIGNIFICANT CHANGE UP (ref 3.5–5.3)
RBC # BLD: 4.05 M/UL — SIGNIFICANT CHANGE UP (ref 3.8–5.2)
RBC # FLD: 14.6 % — HIGH (ref 10.3–14.5)
RH IG SCN BLD-IMP: POSITIVE — SIGNIFICANT CHANGE UP
RH IG SCN BLD-IMP: POSITIVE — SIGNIFICANT CHANGE UP
SODIUM SERPL-SCNC: 139 MMOL/L — SIGNIFICANT CHANGE UP (ref 135–145)
WBC # BLD: 6.86 K/UL — SIGNIFICANT CHANGE UP (ref 3.8–10.5)
WBC # FLD AUTO: 6.86 K/UL — SIGNIFICANT CHANGE UP (ref 3.8–10.5)

## 2020-07-08 RX ORDER — SODIUM CHLORIDE 9 MG/ML
1000 INJECTION, SOLUTION INTRAVENOUS
Refills: 0 | Status: DISCONTINUED | OUTPATIENT
Start: 2020-07-08 | End: 2020-07-09

## 2020-07-08 NOTE — PROGRESS NOTE ADULT - ASSESSMENT
44 year old woman with no significant pmhx who presents to the ED at request of her nephrologist for EDIE/hydronephrosis likely related to fibroid uterus.    EDIE  Scr 1.08 1 year ago  EDIE likely sec to obstruction  CT with b/l hydro  f/u  and GYN  cleared from renal stand point for surgery with anesthesia,   plan for OR today  monitor bmp  avoid nephrotoxic agents    Elevated BP without diagnosis of HTN   bp now controlled  not on meds  continue to monitor    Acidosis  non AG  bicarb 650 tid x 2 days  better  monitor

## 2020-07-08 NOTE — PROGRESS NOTE ADULT - PROBLEM SELECTOR PLAN 1
EDIE likely obstructive with severe b/l hydronephrosis likely 2/2 large fibroid uterus compressing ureters  -pt currently asymptomatic, gyn consulted, plan for likely hysterectomy today  -Cr stable.  - GYN and renal eval appreciated.

## 2020-07-08 NOTE — PROGRESS NOTE ADULT - SUBJECTIVE AND OBJECTIVE BOX
Subjective    Seen and examined.  Declined nephrostomy tubes and cruz catheter yesterday.  Voiding well, no flank pain.    Objective    Vital signs  T(F): , Max: 98.7 (07-07-20 @ 21:50)  HR: 92 (07-08-20 @ 05:54)  BP: 129/79 (07-08-20 @ 05:54)  SpO2: 100% (07-08-20 @ 05:54)  Wt(kg): --    Output     OUT:    Voided: 1101 mL  Total OUT: 1101 mL    Total NET: -1101 mL          Physical Exam  Gen NAD  Abd soft NT    no cruz    Labs      07-07 @ 14:11    WBC 7.87  / Hct 32.5  / SCr --       07-07 @ 07:00    WBC --    / Hct --    / SCr 2.00       Urine Cx:   Culture - Urine (07.06.20 @ 23:52)    Specimen Source: .Urine Clean Catch (Midstream)    Culture Results:   <10,000 CFU/mL Normal Urogenital Ale    	    Imaging  < from: CT Abdomen and Pelvis No Cont (07.07.20 @ 01:18) >    EXAM:  CT ABDOMEN AND PELVIS        PROCEDURE DATE:  Jul 7 2020         INTERPRETATION:  CLINICAL INFORMATION: Bilateral hydronephrosis, masses palpated, acute kidney injury    COMPARISON: Ultrasound kidneys 7/6/2020    PROCEDURE:   CT of the Abdomen and Pelvis was performed without intravenous contrast.   Intravenous contrast: None.  Oral contrast: None.  Sagittal and coronal reformats were performed.    FINDINGS:  LOWER CHEST: Within normal limits.    LIVER: Within normal limits.  BILE DUCTS: Normal caliber.  GALLBLADDER: Within normal limits.  SPLEEN: Within normal limits.  PANCREAS: Within normal limits.  ADRENALS: Within normal limits.  KIDNEYS/URETERS/REPRODUCTIVE ORGANS: Severe bilateral hydronephrosis secondary to extensive myomatous uterus. Uterus is lobulated and measures 19.8 x 23.8 x 12.1 cm. Largest fibroid is inferior and measures approximately 11.5 x 11.4 x 11.0 cm.    BLADDER: Within normal limits.    BOWEL: No bowel obstruction. Appendix is normal.  PERITONEUM: No ascites.  VESSELS: Within normal limits.  RETROPERITONEUM/LYMPH NODES: No lymphadenopathy.    ABDOMINAL WALL: Within normal limits.  BONES: Within normal limits.    IMPRESSION:   Severe bilateral hydronephrosis secondary to massively enlarged uterus, presumably related to the presence of fibroids.              YVONNE KULDEEP M.D., RADIOLOGY RESIDENT  This document has been electronically signed.  JULIÁN NAVARRETE   This document has been electronically signed. Jul 7 2020  1:51AM                  < end of copied text >

## 2020-07-08 NOTE — PROGRESS NOTE ADULT - ASSESSMENT
A/P: 44y yo admitted with an EDIE 2/2 hydronephrosis from a large, myomatous uterus causing b/l urethral obstruction. Patient is doing well and has been counseled on her surgical plan ([facility], BLS).    Myomatous uterus causing urethral obstruction  - Plan for OR today 7/8  - NPO until surgery  - Will provide recs after surgery      to be d/w VWhite PGY-3      Carole Bush MD, MS   PGY-1 A/P: 44y yo admitted with an EDIE 2/2 hydronephrosis from a large, myomatous uterus causing b/l urethral obstruction. Patient is doing well and has been counseled on her surgical plan ([facility], BLS).    Myomatous uterus causing urethral obstruction  - Plan for OR today 7/8  - NPO until surgery  - Hold SubQ heparin   - Will provide recs after surgery      to be d/w VWhite PGY-3      Carole Bush MD, MS   PGY-1 A/P: 44y yo admitted with an EDIE 2/2 hydronephrosis from a large, myomatous uterus causing b/l ureteral obstruction. Patient is doing well and has been counseled on her surgical plan (Total Abdominal Hysterectomy, Bilateral Salpingectomy ).    Myomatous uterus causing ureteral obstruction  - Plan for OR today 7/8  - NPO/IVF  - Hold SubQ heparin   - Appreciate excellent care per primary team      d/w gyn team  Carole Bush MD, MS   PGY-1        Patient seen at bedside with PGY1  Agree with plan as above  Patient for OR today, no current complaints  PAP from outside provider received overnight, copy placed in chart (cytology neg, HPV 18+)  AM CBC reviewed, stable  BMP pending    Audrey Zelaya PGY3

## 2020-07-08 NOTE — PROGRESS NOTE ADULT - ASSESSMENT
44yoF with b/l hydronephrosis, EDIE 2/2 massively enlarged uterus    - Patient refusing cruz catheter and NTs at this time  - On schedule for abdominal hysterectomy today per patient  - Rec trend Cr  - Strict I&Os  - Discussed potential risks of refusing nephrostomy tubes and the potential need for them if Cr worsens

## 2020-07-08 NOTE — PROGRESS NOTE ADULT - SUBJECTIVE AND OBJECTIVE BOX
[facility] [place] [facility]   MD AUDREY TOVAR DO ANAM SIDDIQUI ANGELA WONG, PA    TEL:  OFFICE: [phone]  DR ENCARNACION CELL: [phone]  DR. JOE CELL: [phone]  DR. CHA CELL: [phone]  MARY HANSEN CELL: [phone]    From 5pm-7am Answering Service [phone]    Patient is a 44y old  Female who presents with a chief complaint of hydronephrosis (08 Jul 2020 06:50)      Patient seen and examined at bedside. No chest pain/sob    VITALS:  T(F): 98.3 (07-08-20 @ 05:54), Max: 98.7 (07-07-20 @ 21:50)  HR: 92 (07-08-20 @ 05:54)  BP: 129/79 (07-08-20 @ 05:54)  RR: 16 (07-08-20 @ 05:54)  SpO2: 100% (07-08-20 @ 05:54)  Wt(kg): --    07-07 @ 07:01  -  07-08 @ 07:00  --------------------------------------------------------  IN: 0 mL / OUT: 1101 mL / NET: -1101 mL      Height (cm): 170.2 (07-07 @ 18:54)  Weight (kg): 104.1 (07-07 @ 18:54)  BMI (kg/m2): 35.9 (07-07 @ 18:54)  BSA (m2): 2.14 (07-07 @ 18:54)    PHYSICAL EXAM:  Constitutional: NAD  Neck: No JVD  Respiratory: CTAB, no wheezes, rales or rhonchi  Cardiovascular: S1, S2, RRR  Gastrointestinal: BS+, soft, NT/ND  Extremities: No peripheral edema    Hospital Medications:   MEDICATIONS  (STANDING):  lactated ringers. 1000 milliLiter(s) (125 mL/Hr) IV Continuous <Continuous>  sodium bicarbonate 650 milliGRAM(s) Oral three times a day      LABS:  07-08    139  |  106  |  21  ----------------------------<  93  3.8   |  19<L>  |  2.17<H>    Ca    9.7      08 Jul 2020 06:30  Phos  4.3     07-07  Mg     1.6     07-07    TPro  8.1  /  Alb  4.4  /  TBili  0.3  /  DBili      /  AST  20  /  ALT  12  /  AlkPhos  56  07-06    Creatinine Trend: 2.17 <--, 2.00 <--, 2.36 <--                                10.0   6.86  )-----------( 251      ( 08 Jul 2020 06:30 )             31.7     Urine Studies:  Urinalysis - [07-06-20 @ 21:41]      Color COLORLESS / Appearance CLEAR / SG 1.010 / pH 6.0      Gluc NEGATIVE / Ketone NEGATIVE  / Bili NEGATIVE / Urobili NORMAL       Blood NEGATIVE / Protein NEGATIVE / Leuk Est SMALL / Nitrite NEGATIVE      RBC 0-2 / WBC 6-10 / Hyaline NEGATIVE / Gran  / Sq Epi OCC / Non Sq Epi  / Bacteria NEGATIVE    Urine Creatinine 45.70      [07-07-20 @ 03:10]  Urine Sodium 61      [07-07-20 @ 03:10]  Urine Potassium 14.4      [07-07-20 @ 03:10]  Urine Chloride 45      [07-07-20 @ 03:10]          RADIOLOGY & ADDITIONAL STUDIES:

## 2020-07-08 NOTE — PROGRESS NOTE ADULT - SUBJECTIVE AND OBJECTIVE BOX
GYN Progress Note   HD#2    Patient seen and examined at bedside.  No acute events overnight. No acute concerns. Reports no pain. Has been NPO all night for OR today.   Patient is voiding spontaneously.   All previous pap smears wnl. No family hx of cancer.   Denies CP, SOB, N/V, fevers, and chills.    Vital Signs Last 24 Hours  T(C): 36.8 (20 @ 05:54), Max: 37.1 (20 @ 21:50)  HR: 92 (20 @ 05:54) (92 - 102)  BP: 129/79 (20 @ 05:54) (129/79 - 156/109)  RR: 16 (20 @ 05:54) (16 - 18)  SpO2: 100% (20 @ 05:54) (100% - 100%)    I&O's Summary    2020 07:01  -  2020 06:50  --------------------------------------------------------  IN: 0 mL / OUT: 1101 mL / NET: -1101 mL      Labs:                        10.3   7.87  )-----------( 264      ( 2020 14:11 )             32.5   baso x      eos x      imm gran x      lymph x      mono x      poly x                            10.0   7.42  )-----------( 248      ( 2020 21:41 )             31.4   baso 0.5    eos 3.2    imm gran 0.3    lymph 28.0   mono 8.5    poly 59.5                10.3<L>  7.87  )-----------( 264      (  14:11 )             32.5<L>               10.0<L>  7.42  )-----------( 248      (  21:41 )             31.4<L>          136  |  104  |  24<H>  ----------------------------<  74  4.7   |  14<L>  |  2.00<H>    Ca    9.4      2020 07:00  Phos  4.3     07-07  Mg     1.6     07-07    TPro  8.1  /  Alb  4.4  /  TBili  0.3  /  DBili  x   /  AST  20  /  ALT  12  /  AlkPhos  56  07-06      Urinalysis Basic - ( 2020 21:41 )    Color: COLORLESS / Appearance: CLEAR / S.010 / pH: 6.0  Gluc: NEGATIVE / Ketone: NEGATIVE  / Bili: NEGATIVE / Urobili: NORMAL   Blood: NEGATIVE / Protein: NEGATIVE / Nitrite: NEGATIVE   Leuk Esterase: SMALL / RBC: 0-2 / WBC 6-10   Sq Epi: OCC / Non Sq Epi: x / Bacteria: NEGATIVE        MEDICATIONS  (STANDING):  lactated ringers. 1000 milliLiter(s) (125 mL/Hr) IV Continuous <Continuous>  sodium bicarbonate 650 milliGRAM(s) Oral three times a day    MEDICATIONS  (PRN): GYN Progress Note   HD#2    Patient seen and examined at bedside with gyn team.  No acute events overnight. No acute concerns. Reports no pain. Has been NPO since 12a for OR today.   Patient is voiding spontaneously.   All previous pap smears wnl per patient. No family hx of cancer.   Denies CP, SOB, N/V, fevers, and chills.    Vital Signs Last 24 Hours  T(C): 36.8 (20 @ 05:54), Max: 37.1 (20 @ 21:50)  HR: 92 (20 @ 05:54) (92 - 102)  BP: 129/79 (20 @ 05:54) (129/79 - 156/109)  RR: 16 (20 @ 05:54) (16 - 18)  SpO2: 100% (20 @ 05:54) (100% - 100%)    Physical Exam:   General: sitting comfortably in bed, NAD   CV: RRR  Lungs: CTA b/l, good air flow b/l   Abd: Soft, non-tender, non-distended.  Ext: non-tender b/l, no edema     I&O's Summary    2020 07:01  -  2020 06:50  --------------------------------------------------------  IN: 0 mL / OUT: 1101 mL / NET: -1101 mL      Labs:                        10.3   7.87  )-----------( 264      ( 2020 14:11 )             32.5   baso x      eos x      imm gran x      lymph x      mono x      poly x                            10.0   7.42  )-----------( 248      ( 2020 21:41 )             31.4   baso 0.5    eos 3.2    imm gran 0.3    lymph 28.0   mono 8.5    poly 59.5                10.3<L>  7.87  )-----------( 264      (  14:11 )             32.5<L>               10.0<L>  7.42  )-----------( 248      (  @ 21:41 )             31.4<L>          136  |  104  |  24<H>  ----------------------------<  74  4.7   |  14<L>  |  2.00<H>    Ca    9.4      2020 07:00  Phos  4.3       Mg     1.6         TPro  8.1  /  Alb  4.4  /  TBili  0.3  /  DBili  x   /  AST  20  /  ALT  12  /  AlkPhos  56        Urinalysis Basic - ( 2020 21:41 )    Color: COLORLESS / Appearance: CLEAR / S.010 / pH: 6.0  Gluc: NEGATIVE / Ketone: NEGATIVE  / Bili: NEGATIVE / Urobili: NORMAL   Blood: NEGATIVE / Protein: NEGATIVE / Nitrite: NEGATIVE   Leuk Esterase: SMALL / RBC: 0-2 / WBC 6-10   Sq Epi: OCC / Non Sq Epi: x / Bacteria: NEGATIVE        MEDICATIONS  (STANDING):  lactated ringers. 1000 milliLiter(s) (125 mL/Hr) IV Continuous <Continuous>  sodium bicarbonate 650 milliGRAM(s) Oral three times a day    MEDICATIONS  (PRN): GYN Progress Note   HD#2    Patient seen and examined at bedside with gyn team.  No acute events overnight. No acute concerns. Reports no pain. Has been NPO since 12a for OR today.   Patient is voiding spontaneously.   All previous pap smears wnl per patient. No family hx of cancer.   Denies CP, SOB, N/V, fevers, and chills.    Vital Signs Last 24 Hours  T(C): 36.8 (20 @ 05:54), Max: 37.1 (20 @ 21:50)  HR: 92 (20 @ 05:54) (92 - 102)  BP: 129/79 (20 @ 05:54) (129/79 - 156/109)  RR: 16 (20 @ 05:54) (16 - 18)  SpO2: 100% (20 @ 05:54) (100% - 100%)    Physical Exam:   General: sitting comfortably in bed, NAD   Ext: non-tender b/l, no edema     I&O's Summary    2020 07:01  -  2020 06:50  --------------------------------------------------------  IN: 0 mL / OUT: 1101 mL / NET: -1101 mL      Labs:                        10.3   7.87  )-----------( 264      ( 2020 14:11 )             32.5   baso x      eos x      imm gran x      lymph x      mono x      poly x                            10.0   7.42  )-----------( 248      ( 2020 21:41 )             31.4   baso 0.5    eos 3.2    imm gran 0.3    lymph 28.0   mono 8.5    poly 59.5                10.3<L>  7.87  )-----------( 264      (  @ 14:11 )             32.5<L>               10.0<L>  7.42  )-----------( 248      (  @ 21:41 )             31.4<L>      07-07    136  |  104  |  24<H>  ----------------------------<  74  4.7   |  14<L>  |  2.00<H>    Ca    9.4      2020 07:00  Phos  4.3     07-  Mg     1.6     -    TPro  8.1  /  Alb  4.4  /  TBili  0.3  /  DBili  x   /  AST  20  /  ALT  12  /  AlkPhos  56  07-      Urinalysis Basic - ( 2020 21:41 )    Color: COLORLESS / Appearance: CLEAR / S.010 / pH: 6.0  Gluc: NEGATIVE / Ketone: NEGATIVE  / Bili: NEGATIVE / Urobili: NORMAL   Blood: NEGATIVE / Protein: NEGATIVE / Nitrite: NEGATIVE   Leuk Esterase: SMALL / RBC: 0-2 / WBC 6-10   Sq Epi: OCC / Non Sq Epi: x / Bacteria: NEGATIVE        MEDICATIONS  (STANDING):  lactated ringers. 1000 milliLiter(s) (125 mL/Hr) IV Continuous <Continuous>  sodium bicarbonate 650 milliGRAM(s) Oral three times a day    MEDICATIONS  (PRN):

## 2020-07-08 NOTE — CHART NOTE - NSCHARTNOTEFT_GEN_A_CORE
Pt's case being 'bumped' today due to Gyn emergency from ED that needs to go to OR.  OR unable to guarantee room available after surgery due to late time (nursing, anesthesia, other possible emergencies and add on list with 4 pts).  Decision made to cancel case  for today and have pt on OR schedule for tomorrow.  Pt to go at 10 am or 1:30 pm tomorrow.      Discussed above with pt and pt in agreement.      Will  1) Return back to floor  2) Pt can have regular diet  3) NPO after 12 am today  4) Will inform pt of exact time for surgery tomorrow later in the afternoon.  5) OR and nursing aware    ZA Corado

## 2020-07-08 NOTE — PROGRESS NOTE ADULT - SUBJECTIVE AND OBJECTIVE BOX
Patient is a 44y old  Female who presents with a chief complaint of hydronephrosis (2020 11:34)      SUBJECTIVE / OVERNIGHT EVENTS:        Vital Signs Last 24 Hrs  T(C): 36.8 (2020 05:54), Max: 37.1 (2020 21:50)  T(F): 98.3 (2020 05:54), Max: 98.7 (2020 21:50)  HR: 92 (2020 05:54) (92 - 102)  BP: 129/79 (2020 05:54) (129/79 - 149/95)  BP(mean): --  RR: 16 (2020 05:54) (16 - 18)  SpO2: 100% (2020 05:54) (100% - 100%)  I&O's Summary    2020 07:01  -  2020 07:00  --------------------------------------------------------  IN: 0 mL / OUT: 1101 mL / NET: -1101 mL        PHYSICAL EXAM:  GENERAL: NAD, Comfortable  HEAD:  Atraumatic, Normocephalic  EYES: EOMI, PERRLA, conjunctiva and sclera clear  NECK: Supple, No JVD  CHEST/LUNG: Clear to auscultation bilaterally; No wheeze  HEART: Regular rate and rhythm; No murmurs, rubs, or gallops  ABDOMEN: Soft, Nontender, Nondistended; Bowel sounds present  Neuro: AAO x 3, no focal deficit, 5/5 b/l extremities  EXTREMITIES:  2+ Peripheral Pulses, No clubbing, cyanosis, or edema  SKIN: No rashes or lesions    LABS:                        10.0   6.86  )-----------( 251      ( 2020 06:30 )             31.7     07-08    139  |  106  |  21  ----------------------------<  93  3.8   |  19<L>  |  2.17<H>    Ca    9.7      2020 06:30  Phos  4.3     07-07  Mg     1.6     07-07    TPro  8.1  /  Alb  4.4  /  TBili  0.3  /  DBili  x   /  AST  20  /  ALT  12  /  AlkPhos  56  -      CAPILLARY BLOOD GLUCOSE            Urinalysis Basic - ( 2020 21:41 )    Color: COLORLESS / Appearance: CLEAR / S.010 / pH: 6.0  Gluc: NEGATIVE / Ketone: NEGATIVE  / Bili: NEGATIVE / Urobili: NORMAL   Blood: NEGATIVE / Protein: NEGATIVE / Nitrite: NEGATIVE   Leuk Esterase: SMALL / RBC: 0-2 / WBC 6-10   Sq Epi: OCC / Non Sq Epi: x / Bacteria: NEGATIVE        RADIOLOGY & ADDITIONAL TESTS:    Imaging Personally Reviewed:  [x] YES  [ ] NO    Consultant(s) Notes Reviewed:  [x] YES  [ ] NO      MEDICATIONS  (STANDING):  lactated ringers. 1000 milliLiter(s) (125 mL/Hr) IV Continuous <Continuous>  sodium bicarbonate 650 milliGRAM(s) Oral three times a day    MEDICATIONS  (PRN):      Care Discussed with Consultants/Other Providers [x] YES  [ ] NO    HEALTH ISSUES - PROBLEM Dx:  Need for prophylactic measure: Need for prophylactic measure  Fibroid: Fibroid  Acute kidney injury: Acute kidney injury

## 2020-07-09 ENCOUNTER — RESULT REVIEW (OUTPATIENT)
Age: 45
End: 2020-07-09

## 2020-07-09 LAB
ANION GAP SERPL CALC-SCNC: 13 MMO/L — SIGNIFICANT CHANGE UP (ref 7–14)
BUN SERPL-MCNC: 23 MG/DL — SIGNIFICANT CHANGE UP (ref 7–23)
CALCIUM SERPL-MCNC: 9.4 MG/DL — SIGNIFICANT CHANGE UP (ref 8.4–10.5)
CHLORIDE SERPL-SCNC: 106 MMOL/L — SIGNIFICANT CHANGE UP (ref 98–107)
CO2 SERPL-SCNC: 20 MMOL/L — LOW (ref 22–31)
CREAT SERPL-MCNC: 2.25 MG/DL — HIGH (ref 0.5–1.3)
GLUCOSE BLDC GLUCOMTR-MCNC: 98 MG/DL — SIGNIFICANT CHANGE UP (ref 70–99)
GLUCOSE SERPL-MCNC: 101 MG/DL — HIGH (ref 70–99)
HCG UR-SCNC: NEGATIVE — SIGNIFICANT CHANGE UP
HCT VFR BLD CALC: 28.7 % — LOW (ref 34.5–45)
HCT VFR BLD CALC: 34.2 % — LOW (ref 34.5–45)
HGB BLD-MCNC: 11 G/DL — LOW (ref 11.5–15.5)
HGB BLD-MCNC: 9.2 G/DL — LOW (ref 11.5–15.5)
MCHC RBC-ENTMCNC: 25 PG — LOW (ref 27–34)
MCHC RBC-ENTMCNC: 25.6 PG — LOW (ref 27–34)
MCHC RBC-ENTMCNC: 32.1 % — SIGNIFICANT CHANGE UP (ref 32–36)
MCHC RBC-ENTMCNC: 32.2 % — SIGNIFICANT CHANGE UP (ref 32–36)
MCV RBC AUTO: 78 FL — LOW (ref 80–100)
MCV RBC AUTO: 79.5 FL — LOW (ref 80–100)
NRBC # FLD: 0 K/UL — SIGNIFICANT CHANGE UP (ref 0–0)
NRBC # FLD: 0 K/UL — SIGNIFICANT CHANGE UP (ref 0–0)
PLATELET # BLD AUTO: 234 K/UL — SIGNIFICANT CHANGE UP (ref 150–400)
PLATELET # BLD AUTO: 242 K/UL — SIGNIFICANT CHANGE UP (ref 150–400)
PMV BLD: 10.1 FL — SIGNIFICANT CHANGE UP (ref 7–13)
PMV BLD: 10.7 FL — SIGNIFICANT CHANGE UP (ref 7–13)
POTASSIUM SERPL-MCNC: 3.4 MMOL/L — LOW (ref 3.5–5.3)
POTASSIUM SERPL-SCNC: 3.4 MMOL/L — LOW (ref 3.5–5.3)
RBC # BLD: 3.68 M/UL — LOW (ref 3.8–5.2)
RBC # BLD: 4.3 M/UL — SIGNIFICANT CHANGE UP (ref 3.8–5.2)
RBC # FLD: 14.6 % — HIGH (ref 10.3–14.5)
RBC # FLD: 14.8 % — HIGH (ref 10.3–14.5)
RH IG SCN BLD-IMP: POSITIVE — SIGNIFICANT CHANGE UP
SODIUM SERPL-SCNC: 139 MMOL/L — SIGNIFICANT CHANGE UP (ref 135–145)
SP GR UR: 1.01 — SIGNIFICANT CHANGE UP (ref 1–1.04)
WBC # BLD: 16.39 K/UL — HIGH (ref 3.8–10.5)
WBC # BLD: 6.49 K/UL — SIGNIFICANT CHANGE UP (ref 3.8–10.5)
WBC # FLD AUTO: 16.39 K/UL — HIGH (ref 3.8–10.5)
WBC # FLD AUTO: 6.49 K/UL — SIGNIFICANT CHANGE UP (ref 3.8–10.5)

## 2020-07-09 PROCEDURE — 88307 TISSUE EXAM BY PATHOLOGIST: CPT | Mod: 26

## 2020-07-09 PROCEDURE — 58150 TOTAL HYSTERECTOMY: CPT

## 2020-07-09 RX ORDER — OXYCODONE HYDROCHLORIDE 5 MG/1
5 TABLET ORAL
Refills: 0 | Status: DISCONTINUED | OUTPATIENT
Start: 2020-07-09 | End: 2020-07-11

## 2020-07-09 RX ORDER — HYDROMORPHONE HYDROCHLORIDE 2 MG/ML
0.5 INJECTION INTRAMUSCULAR; INTRAVENOUS; SUBCUTANEOUS
Refills: 0 | Status: DISCONTINUED | OUTPATIENT
Start: 2020-07-09 | End: 2020-07-10

## 2020-07-09 RX ORDER — HYDROMORPHONE HYDROCHLORIDE 2 MG/ML
0.5 INJECTION INTRAMUSCULAR; INTRAVENOUS; SUBCUTANEOUS
Refills: 0 | Status: DISCONTINUED | OUTPATIENT
Start: 2020-07-09 | End: 2020-07-09

## 2020-07-09 RX ORDER — ONDANSETRON 8 MG/1
4 TABLET, FILM COATED ORAL
Refills: 0 | Status: DISCONTINUED | OUTPATIENT
Start: 2020-07-09 | End: 2020-07-09

## 2020-07-09 RX ORDER — ONDANSETRON 8 MG/1
4 TABLET, FILM COATED ORAL
Refills: 0 | Status: DISCONTINUED | OUTPATIENT
Start: 2020-07-09 | End: 2020-07-13

## 2020-07-09 RX ORDER — IBUPROFEN 200 MG
600 TABLET ORAL EVERY 6 HOURS
Refills: 0 | Status: DISCONTINUED | OUTPATIENT
Start: 2020-07-09 | End: 2020-07-10

## 2020-07-09 RX ORDER — POTASSIUM CHLORIDE 20 MEQ
10 PACKET (EA) ORAL
Refills: 0 | Status: DISCONTINUED | OUTPATIENT
Start: 2020-07-09 | End: 2020-07-09

## 2020-07-09 RX ORDER — HEPARIN SODIUM 5000 [USP'U]/ML
5000 INJECTION INTRAVENOUS; SUBCUTANEOUS EVERY 8 HOURS
Refills: 0 | Status: DISCONTINUED | OUTPATIENT
Start: 2020-07-09 | End: 2020-07-13

## 2020-07-09 RX ORDER — ACETAMINOPHEN 500 MG
975 TABLET ORAL
Refills: 0 | Status: DISCONTINUED | OUTPATIENT
Start: 2020-07-09 | End: 2020-07-13

## 2020-07-09 RX ORDER — OXYCODONE HYDROCHLORIDE 5 MG/1
5 TABLET ORAL ONCE
Refills: 0 | Status: DISCONTINUED | OUTPATIENT
Start: 2020-07-09 | End: 2020-07-13

## 2020-07-09 RX ORDER — OXYCODONE HYDROCHLORIDE 5 MG/1
5 TABLET ORAL
Refills: 0 | Status: COMPLETED | OUTPATIENT
Start: 2020-07-09 | End: 2020-07-16

## 2020-07-09 RX ORDER — SODIUM CHLORIDE 9 MG/ML
1000 INJECTION, SOLUTION INTRAVENOUS
Refills: 0 | Status: DISCONTINUED | OUTPATIENT
Start: 2020-07-09 | End: 2020-07-10

## 2020-07-09 RX ADMIN — HYDROMORPHONE HYDROCHLORIDE 0.5 MILLIGRAM(S): 2 INJECTION INTRAMUSCULAR; INTRAVENOUS; SUBCUTANEOUS at 17:10

## 2020-07-09 RX ADMIN — SODIUM CHLORIDE 125 MILLILITER(S): 9 INJECTION, SOLUTION INTRAVENOUS at 00:13

## 2020-07-09 RX ADMIN — SODIUM CHLORIDE 125 MILLILITER(S): 9 INJECTION, SOLUTION INTRAVENOUS at 16:15

## 2020-07-09 RX ADMIN — Medication 100 MILLIEQUIVALENT(S): at 09:01

## 2020-07-09 RX ADMIN — Medication 100 MILLIEQUIVALENT(S): at 09:59

## 2020-07-09 RX ADMIN — Medication 975 MILLIGRAM(S): at 21:19

## 2020-07-09 RX ADMIN — OXYCODONE HYDROCHLORIDE 5 MILLIGRAM(S): 5 TABLET ORAL at 21:18

## 2020-07-09 RX ADMIN — HEPARIN SODIUM 5000 UNIT(S): 5000 INJECTION INTRAVENOUS; SUBCUTANEOUS at 21:17

## 2020-07-09 NOTE — PROGRESS NOTE ADULT - PROBLEM SELECTOR PLAN 1
EDIE likely obstructive with severe b/l hydronephrosis likely 2/2 large fibroid uterus compressing ureters  -pt currently asymptomatic, gyn consulted, plan for total hysterectomy today  - medically optimized.   -Cr stable. will monitor post op Cr, expected to improve   - GYN and renal eval appreciated.

## 2020-07-09 NOTE — CHART NOTE - NSCHARTNOTEFT_GEN_A_CORE
Gyn Attending Pre-Op Note    Pt with symptomatic l uteri causing bilateral hydronephrosis and increase in creatinine as per nephrology. Pt is cleared for OR.    Pt understands risks/benefits of surgery including but not limited to bleeding, infection, injurty to bowel/bladder, nerve damage, blood tx, and even death.  Pt has verbalized understanding of the above and has signed informed consent.    ZA Corado

## 2020-07-09 NOTE — PROGRESS NOTE ADULT - SUBJECTIVE AND OBJECTIVE BOX
Patient is a 44y old  Female who presents with a chief complaint of hydronephrosis (09 Jul 2020 16:16)      SUBJECTIVE / OVERNIGHT EVENTS:  OR canceled yesterday due to emergency  plan for OR today  no cp, no sob, no n/v/d. no abdominal pain.  no headache, no dizziness.       Vital Signs Last 24 Hrs  T(C): 36.4 (09 Jul 2020 19:13), Max: 37.7 (09 Jul 2020 10:20)  T(F): 97.6 (09 Jul 2020 19:13), Max: 99.8 (09 Jul 2020 10:20)  HR: 102 (09 Jul 2020 19:13) (83 - 102)  BP: 155/89 (09 Jul 2020 19:13) (130/86 - 163/58)  BP(mean): 109 (09 Jul 2020 17:30) (100 - 111)  RR: 16 (09 Jul 2020 19:13) (13 - 16)  SpO2: 100% (09 Jul 2020 19:13) (95% - 100%)  I&O's Summary    08 Jul 2020 07:01  -  09 Jul 2020 07:00  --------------------------------------------------------  IN: 1000 mL / OUT: 875 mL / NET: 125 mL    09 Jul 2020 07:01  -  09 Jul 2020 19:52  --------------------------------------------------------  IN: 300 mL / OUT: 1350 mL / NET: -1050 mL      PHYSICAL EXAM:  GENERAL: NAD, Comfortable  HEAD:  Atraumatic, Normocephalic  EYES: EOMI, PERRLA, conjunctiva and sclera clear  NECK: Supple, No JVD  CHEST/LUNG: Clear to auscultation bilaterally; No wheeze  HEART: Regular rate and rhythm; No murmurs, rubs, or gallops  ABDOMEN: Soft, Nontender, Nondistended; Bowel sounds present  Neuro: AAO x 3, no focal deficit, 5/5 b/l extremities  EXTREMITIES:  2+ Peripheral Pulses, No clubbing, cyanosis, or edema  SKIN: No rashes or lesions      LABS:                        9.2    6.49  )-----------( 234      ( 09 Jul 2020 05:09 )             28.7     07-09    139  |  106  |  23  ----------------------------<  101<H>  3.4<L>   |  20<L>  |  2.25<H>    Ca    9.4      09 Jul 2020 05:09        CAPILLARY BLOOD GLUCOSE      POCT Blood Glucose.: 98 mg/dL (09 Jul 2020 10:23)            RADIOLOGY & ADDITIONAL TESTS:    Imaging Personally Reviewed:  [x] YES  [ ] NO    Consultant(s) Notes Reviewed:  [x] YES  [ ] NO      MEDICATIONS  (STANDING):  acetaminophen   Tablet .. 975 milliGRAM(s) Oral <User Schedule>  heparin   Injectable 5000 Unit(s) SubCutaneous every 8 hours  ibuprofen  Tablet. 600 milliGRAM(s) Oral every 6 hours  lactated ringers. 1000 milliLiter(s) (125 mL/Hr) IV Continuous <Continuous>    MEDICATIONS  (PRN):  HYDROmorphone  Injectable 0.5 milliGRAM(s) IV Push every 30 minutes PRN Moderate Pain (4 - 6)  ondansetron Injectable 4 milliGRAM(s) IV Push every 20 minutes PRN Nausea and/or Vomiting  oxyCODONE    IR 5 milliGRAM(s) Oral every 3 hours PRN Moderate to Severe Pain (4-10)  oxyCODONE    IR 5 milliGRAM(s) Oral once PRN Moderate to Severe Pain (4-10)      Care Discussed with Consultants/Other Providers [x] YES  [ ] NO    HEALTH ISSUES - PROBLEM Dx:  Need for prophylactic measure: Need for prophylactic measure  Fibroid: Fibroid  Acute kidney injury: Acute kidney injury

## 2020-07-09 NOTE — PROGRESS NOTE ADULT - SUBJECTIVE AND OBJECTIVE BOX
R2 OBGYN Progress Note   HD#3    Patient seen and examined at bedside with gyn team.  No acute events overnight. No acute concerns. Reports no pain. Has been NPO since 12a for OR today. Yesterdays case canceled due to unforseen emergent case.  Patient is voiding spontaneously.   All previous pap smears wnl per patient. No family hx of cancer.   Denies CP, SOB, N/V, fevers, and chills.  Vital Signs Last 24 Hours  T(C): 36.9 (07-09-20 @ 05:57), Max: 37 (07-08-20 @ 10:54)  HR: 85 (07-09-20 @ 05:57) (83 - 116)  BP: 143/85 (07-09-20 @ 05:57) (138/88 - 160/104)  RR: 16 (07-09-20 @ 05:57) (14 - 16)  SpO2: 100% (07-09-20 @ 05:57) (99% - 100%)    I&O's Summary    07 Jul 2020 07:01  -  08 Jul 2020 07:00  --------------------------------------------------------  IN: 0 mL / OUT: 1101 mL / NET: -1101 mL    08 Jul 2020 07:01  -  09 Jul 2020 06:23  --------------------------------------------------------  IN: 750 mL / OUT: 550 mL / NET: 200 mL        Physical Exam:  Physical Exam:   General: sitting comfortably in bed, NAD   Ext: non-tender b/l, no edema     Labs:                        10.0   6.86  )-----------( 251      ( 08 Jul 2020 06:30 )             31.7   baso x      eos x      imm gran x      lymph x      mono x      poly x                            10.3   7.87  )-----------( 264      ( 07 Jul 2020 14:11 )             32.5   baso x      eos x      imm gran x      lymph x      mono x      poly x                            10.0   7.42  )-----------( 248      ( 06 Jul 2020 21:41 )             31.4   baso 0.5    eos 3.2    imm gran 0.3    lymph 28.0   mono 8.5    poly 59.5       MEDICATIONS  (STANDING):  lactated ringers. 1000 milliLiter(s) (125 mL/Hr) IV Continuous <Continuous>  sodium bicarbonate 650 milliGRAM(s) Oral three times a day

## 2020-07-09 NOTE — PROGRESS NOTE ADULT - PROBLEM SELECTOR PLAN 1
Myomatous uterus causing ureteral obstruction  - Plan for OR today 7/9  - NPO/IVF  - Hold SubQ heparin   - Appreciate excellent care per primary team      dw GYN Team  Flash Cohen PGY2

## 2020-07-09 NOTE — PROGRESS NOTE ADULT - ASSESSMENT
A/P: 44y yo admitted with an EDIE 2/2 hydronephrosis from a large, myomatous uterus causing b/l ureteral obstruction. Patient is doing well and has been counseled on her surgical plan (Total Abdominal Hysterectomy, Bilateral Salpingectomy ).

## 2020-07-09 NOTE — CHART NOTE - NSCHARTNOTEFT_GEN_A_CORE
Patient seen and examined at bedside, recently post-op. No acute complaints at this time. Denies CP, SOB, N/V, fevers, and chills.    Vital Signs Last 24 Hours  T(C): 36.4 (07-09-20 @ 17:00), Max: 37.7 (07-09-20 @ 10:20)  HR: 89 (07-09-20 @ 17:45) (83 - 99)  BP: 138/94 (07-09-20 @ 17:30) (130/86 - 163/58)  RR: 14 (07-09-20 @ 17:45) (13 - 16)  SpO2: 95% (07-09-20 @ 17:45) (95% - 100%)    I&O's Summary    08 Jul 2020 07:01  -  09 Jul 2020 07:00  --------------------------------------------------------  IN: 1000 mL / OUT: 875 mL / NET: 125 mL    09 Jul 2020 07:01  -  09 Jul 2020 18:38  --------------------------------------------------------  IN: 300 mL / OUT: 550 mL / NET: -250 mL      Physical Exam:  General: NAD  CV: NR, RR, S1, S2, no M/R/G  Lungs: CTA-B  Abdomen: Soft, non-tender, non-distended, +BS  Incision: midline vertical incision w dressing clean and dry  Ext: No pain or swelling    Labs:             9.2<L>  6.49  )-----------( 234      ( 07-09 @ 05:09 )             28.7<L>               10.0<L>  6.86  )-----------( 251      ( 07-08 @ 06:30 )             31.7<L>               10.3<L>  7.87  )-----------( 264      ( 07-07 @ 14:11 )             32.5<L>               10.0<L>  7.42  )-----------( 248      ( 07-06 @ 21:41 )             31.4<L>        MEDICATIONS  (STANDING):  acetaminophen   Tablet .. 975 milliGRAM(s) Oral <User Schedule>  heparin   Injectable 5000 Unit(s) SubCutaneous every 8 hours  ibuprofen  Tablet. 600 milliGRAM(s) Oral every 6 hours  lactated ringers. 1000 milliLiter(s) (125 mL/Hr) IV Continuous <Continuous>    MEDICATIONS  (PRN):  HYDROmorphone  Injectable 0.5 milliGRAM(s) IV Push every 30 minutes PRN Moderate Pain (4 - 6)  ondansetron Injectable 4 milliGRAM(s) IV Push every 20 minutes PRN Nausea and/or Vomiting  oxyCODONE    IR 5 milliGRAM(s) Oral every 3 hours PRN Moderate to Severe Pain (4-10)  oxyCODONE    IR 5 milliGRAM(s) Oral once PRN Moderate to Severe Pain (4-10)      A/P: 45yo s/p elap COREY, BS recently post-op, stable.  Neuro: s/p b/l TAP blocks, PO pain meds prn  CV: VSS, 4hr post-op CBC, AM labs  Pulm: Saturating well on room air, encourage incentive spirometry  GI: Advance diet as tolerated  : UOP adequate, c/w cruz until POD#1  Heme: HSQ and SCDs for DVT ppx  Dispo: Continue routine post-op care    FRANCISCO Orosco PGY4

## 2020-07-09 NOTE — PROGRESS NOTE ADULT - SUBJECTIVE AND OBJECTIVE BOX
[facility]   MD AUDREY TOVAR DO ANAM SIDDIQUI ANGELA WONG, PA    TEL:  OFFICE: [phone]  DR ENCARNACION CELL: [phone]  DR. JOE CELL: [phone]  DR. CHA CELL: [phone]  MARY HANSEN CELL: [phone]    From 5pm-7am Answering Service [phone]    Patient is a 44y old  Female who presents with a chief complaint of hydronephrosis (09 Jul 2020 06:23)      Patient seen and examined at bedside. post or    VITALS:  T(F): 99.8 (07-09-20 @ 10:20), Max: 99.8 (07-09-20 @ 10:20)  HR: 94 (07-09-20 @ 10:20)  BP: 163/58 (07-09-20 @ 10:20)  RR: 16 (07-09-20 @ 10:20)  SpO2: 100% (07-09-20 @ 10:20)  Wt(kg): --    07-08 @ 07:01  -  07-09 @ 07:00  --------------------------------------------------------  IN: 1000 mL / OUT: 875 mL / NET: 125 mL      Height (cm): 170.18 (07-09 @ 08:16)  Weight (kg): 103.4 (07-09 @ 08:16)  BMI (kg/m2): 35.7 (07-09 @ 08:16)  BSA (m2): 2.14 (07-09 @ 08:16)    PHYSICAL EXAM:  Constitutional: sleepy post surgery  Neck: No JVD  Respiratory: CTAB, no wheezes, rales or rhonchi  Cardiovascular: S1, S2, RRR  Gastrointestinal: abd binding d/c/i  Extremities: No peripheral edema    Hospital Medications:   MEDICATIONS  (STANDING):  acetaminophen   Tablet .. 975 milliGRAM(s) Oral <User Schedule>  heparin   Injectable 5000 Unit(s) SubCutaneous every 8 hours  ibuprofen  Tablet. 600 milliGRAM(s) Oral every 6 hours  lactated ringers. 1000 milliLiter(s) (125 mL/Hr) IV Continuous <Continuous>      LABS:  07-09    139  |  106  |  23  ----------------------------<  101<H>  3.4<L>   |  20<L>  |  2.25<H>    Ca    9.4      09 Jul 2020 05:09      Creatinine Trend: 2.25 <--, 2.17 <--, 2.00 <--, 2.36 <--                                9.2    6.49  )-----------( 234      ( 09 Jul 2020 05:09 )             28.7     Urine Studies:  Urinalysis - [07-06-20 @ 21:41]      Color COLORLESS / Appearance CLEAR / SG 1.010 / pH 6.0      Gluc NEGATIVE / Ketone NEGATIVE  / Bili NEGATIVE / Urobili NORMAL       Blood NEGATIVE / Protein NEGATIVE / Leuk Est SMALL / Nitrite NEGATIVE      RBC 0-2 / WBC 6-10 / Hyaline NEGATIVE / Gran  / Sq Epi OCC / Non Sq Epi  / Bacteria NEGATIVE    Urine Creatinine 45.70      [07-07-20 @ 03:10]  Urine Sodium 61      [07-07-20 @ 03:10]  Urine Potassium 14.4      [07-07-20 @ 03:10]  Urine Chloride 45      [07-07-20 @ 03:10]          RADIOLOGY & ADDITIONAL STUDIES:

## 2020-07-09 NOTE — PROGRESS NOTE ADULT - ASSESSMENT
44 year old woman with no significant pmhx who presents to the ED at request of her nephrologist for EDIE/hydronephrosis likely related to fibroid uterus.    EDIE  Scr 1.08 1 year ago  EDIE likely sec to obstruction  CT with b/l hydro  f/u  and GYN  s/p surgery 7/9  monitor bmp  avoid nephrotoxic agents    Elevated BP without diagnosis of HTN   bp now controlled  not on meds  continue to monitor    Acidosis  non AG  bicarb 650 tid x 2 days  better  monitor

## 2020-07-09 NOTE — PRE-OP CHECKLIST - SELECT TESTS ORDERED
BMP/CBC Type and Screen/BMP/CBC HCG/UCG/BMP/Type and Cross/CBC/Type and Screen UCG/POCT Blood Glucose/HCG/BMP/Type and Cross/CBC/Type and Screen

## 2020-07-10 DIAGNOSIS — Z98.890 OTHER SPECIFIED POSTPROCEDURAL STATES: ICD-10-CM

## 2020-07-10 LAB
ANION GAP SERPL CALC-SCNC: 10 MMO/L — SIGNIFICANT CHANGE UP (ref 7–14)
ANION GAP SERPL CALC-SCNC: 13 MMO/L — SIGNIFICANT CHANGE UP (ref 7–14)
BUN SERPL-MCNC: 17 MG/DL — SIGNIFICANT CHANGE UP (ref 7–23)
BUN SERPL-MCNC: 17 MG/DL — SIGNIFICANT CHANGE UP (ref 7–23)
CALCIUM SERPL-MCNC: 8.9 MG/DL — SIGNIFICANT CHANGE UP (ref 8.4–10.5)
CALCIUM SERPL-MCNC: 9.1 MG/DL — SIGNIFICANT CHANGE UP (ref 8.4–10.5)
CHLORIDE SERPL-SCNC: 105 MMOL/L — SIGNIFICANT CHANGE UP (ref 98–107)
CHLORIDE SERPL-SCNC: 106 MMOL/L — SIGNIFICANT CHANGE UP (ref 98–107)
CO2 SERPL-SCNC: 22 MMOL/L — SIGNIFICANT CHANGE UP (ref 22–31)
CO2 SERPL-SCNC: 22 MMOL/L — SIGNIFICANT CHANGE UP (ref 22–31)
CREAT SERPL-MCNC: 2.01 MG/DL — HIGH (ref 0.5–1.3)
CREAT SERPL-MCNC: 2.04 MG/DL — HIGH (ref 0.5–1.3)
GLUCOSE SERPL-MCNC: 112 MG/DL — HIGH (ref 70–99)
GLUCOSE SERPL-MCNC: 120 MG/DL — HIGH (ref 70–99)
HCT VFR BLD CALC: 24.6 % — LOW (ref 34.5–45)
HCT VFR BLD CALC: 25.9 % — LOW (ref 34.5–45)
HGB BLD-MCNC: 8.1 G/DL — LOW (ref 11.5–15.5)
HGB BLD-MCNC: 8.4 G/DL — LOW (ref 11.5–15.5)
MAGNESIUM SERPL-MCNC: 1.1 MG/DL — LOW (ref 1.6–2.6)
MAGNESIUM SERPL-MCNC: 1.5 MG/DL — LOW (ref 1.6–2.6)
MCHC RBC-ENTMCNC: 25.8 PG — LOW (ref 27–34)
MCHC RBC-ENTMCNC: 26.2 PG — LOW (ref 27–34)
MCHC RBC-ENTMCNC: 32.4 % — SIGNIFICANT CHANGE UP (ref 32–36)
MCHC RBC-ENTMCNC: 32.9 % — SIGNIFICANT CHANGE UP (ref 32–36)
MCV RBC AUTO: 78.3 FL — LOW (ref 80–100)
MCV RBC AUTO: 80.7 FL — SIGNIFICANT CHANGE UP (ref 80–100)
NRBC # FLD: 0 K/UL — SIGNIFICANT CHANGE UP (ref 0–0)
NRBC # FLD: 0 K/UL — SIGNIFICANT CHANGE UP (ref 0–0)
PHOSPHATE SERPL-MCNC: 2.8 MG/DL — SIGNIFICANT CHANGE UP (ref 2.5–4.5)
PHOSPHATE SERPL-MCNC: 3.3 MG/DL — SIGNIFICANT CHANGE UP (ref 2.5–4.5)
PLATELET # BLD AUTO: 186 K/UL — SIGNIFICANT CHANGE UP (ref 150–400)
PLATELET # BLD AUTO: 198 K/UL — SIGNIFICANT CHANGE UP (ref 150–400)
PMV BLD: 10.1 FL — SIGNIFICANT CHANGE UP (ref 7–13)
PMV BLD: 10.2 FL — SIGNIFICANT CHANGE UP (ref 7–13)
POTASSIUM SERPL-MCNC: 3.4 MMOL/L — LOW (ref 3.5–5.3)
POTASSIUM SERPL-MCNC: 3.6 MMOL/L — SIGNIFICANT CHANGE UP (ref 3.5–5.3)
POTASSIUM SERPL-SCNC: 3.4 MMOL/L — LOW (ref 3.5–5.3)
POTASSIUM SERPL-SCNC: 3.6 MMOL/L — SIGNIFICANT CHANGE UP (ref 3.5–5.3)
RBC # BLD: 3.14 M/UL — LOW (ref 3.8–5.2)
RBC # BLD: 3.21 M/UL — LOW (ref 3.8–5.2)
RBC # FLD: 14.6 % — HIGH (ref 10.3–14.5)
RBC # FLD: 15 % — HIGH (ref 10.3–14.5)
SODIUM SERPL-SCNC: 138 MMOL/L — SIGNIFICANT CHANGE UP (ref 135–145)
SODIUM SERPL-SCNC: 140 MMOL/L — SIGNIFICANT CHANGE UP (ref 135–145)
WBC # BLD: 10.51 K/UL — HIGH (ref 3.8–10.5)
WBC # BLD: 12.01 K/UL — HIGH (ref 3.8–10.5)
WBC # FLD AUTO: 10.51 K/UL — HIGH (ref 3.8–10.5)
WBC # FLD AUTO: 12.01 K/UL — HIGH (ref 3.8–10.5)

## 2020-07-10 RX ORDER — MAGNESIUM SULFATE 500 MG/ML
2 VIAL (ML) INJECTION ONCE
Refills: 0 | Status: COMPLETED | OUTPATIENT
Start: 2020-07-10 | End: 2020-07-10

## 2020-07-10 RX ORDER — POTASSIUM CHLORIDE 20 MEQ
20 PACKET (EA) ORAL
Refills: 0 | Status: COMPLETED | OUTPATIENT
Start: 2020-07-10 | End: 2020-07-10

## 2020-07-10 RX ORDER — SIMETHICONE 80 MG/1
80 TABLET, CHEWABLE ORAL DAILY
Refills: 0 | Status: DISCONTINUED | OUTPATIENT
Start: 2020-07-10 | End: 2020-07-11

## 2020-07-10 RX ORDER — MAGNESIUM SULFATE 500 MG/ML
2 VIAL (ML) INJECTION
Refills: 0 | Status: DISCONTINUED | OUTPATIENT
Start: 2020-07-10 | End: 2020-07-10

## 2020-07-10 RX ORDER — POTASSIUM CHLORIDE 20 MEQ
20 PACKET (EA) ORAL
Refills: 0 | Status: DISCONTINUED | OUTPATIENT
Start: 2020-07-10 | End: 2020-07-10

## 2020-07-10 RX ADMIN — HEPARIN SODIUM 5000 UNIT(S): 5000 INJECTION INTRAVENOUS; SUBCUTANEOUS at 14:15

## 2020-07-10 RX ADMIN — Medication 20 MILLIEQUIVALENT(S): at 14:14

## 2020-07-10 RX ADMIN — OXYCODONE HYDROCHLORIDE 5 MILLIGRAM(S): 5 TABLET ORAL at 22:47

## 2020-07-10 RX ADMIN — Medication 50 GRAM(S): at 11:27

## 2020-07-10 RX ADMIN — OXYCODONE HYDROCHLORIDE 5 MILLIGRAM(S): 5 TABLET ORAL at 05:15

## 2020-07-10 RX ADMIN — HEPARIN SODIUM 5000 UNIT(S): 5000 INJECTION INTRAVENOUS; SUBCUTANEOUS at 22:48

## 2020-07-10 RX ADMIN — OXYCODONE HYDROCHLORIDE 5 MILLIGRAM(S): 5 TABLET ORAL at 11:54

## 2020-07-10 RX ADMIN — Medication 975 MILLIGRAM(S): at 22:48

## 2020-07-10 RX ADMIN — Medication 975 MILLIGRAM(S): at 03:22

## 2020-07-10 RX ADMIN — HEPARIN SODIUM 5000 UNIT(S): 5000 INJECTION INTRAVENOUS; SUBCUTANEOUS at 05:15

## 2020-07-10 RX ADMIN — Medication 975 MILLIGRAM(S): at 11:26

## 2020-07-10 RX ADMIN — OXYCODONE HYDROCHLORIDE 5 MILLIGRAM(S): 5 TABLET ORAL at 01:11

## 2020-07-10 RX ADMIN — SODIUM CHLORIDE 125 MILLILITER(S): 9 INJECTION, SOLUTION INTRAVENOUS at 14:58

## 2020-07-10 RX ADMIN — Medication 20 MILLIEQUIVALENT(S): at 11:29

## 2020-07-10 RX ADMIN — SIMETHICONE 80 MILLIGRAM(S): 80 TABLET, CHEWABLE ORAL at 17:05

## 2020-07-10 RX ADMIN — OXYCODONE HYDROCHLORIDE 5 MILLIGRAM(S): 5 TABLET ORAL at 14:56

## 2020-07-10 RX ADMIN — OXYCODONE HYDROCHLORIDE 5 MILLIGRAM(S): 5 TABLET ORAL at 18:15

## 2020-07-10 RX ADMIN — Medication 975 MILLIGRAM(S): at 16:59

## 2020-07-10 RX ADMIN — SODIUM CHLORIDE 125 MILLILITER(S): 9 INJECTION, SOLUTION INTRAVENOUS at 11:27

## 2020-07-10 NOTE — PROGRESS NOTE ADULT - ASSESSMENT
44 year old woman with no significant pmhx who presents to the ED at request of her nephrologist for EDIE/hydronephrosis likely related to fibroid uterus.    EDIE  Scr 1.08 1 year ago  EDIE likely sec to obstruction  CT with b/l hydro  f/u  and GYN  s/p surgery 7/9  renal function slightly improving   monitor bmp  avoid nephrotoxic agents    Elevated BP without diagnosis of HTN   bp now controlled  not on meds  continue to monitor    Acidosis  non AG  better  monitor     Hypomagnesemia  supplement  keep mg>2  monitor    Hypokalemia  supplement   keep k>4  monitor

## 2020-07-10 NOTE — PROVIDER CONTACT NOTE (OTHER) - BACKGROUND
No BP medication noted.
Patient has fibroid uterus, going for hysterectomy tomorrow AM
s/p Ex Lap, COREY, b/l salpingectomy 7/09/20
S/P Ex Lap, COREY, B/L Salpingectomy 07/09/20

## 2020-07-10 NOTE — PROVIDER CONTACT NOTE (OTHER) - ASSESSMENT
/89, , RR 16, Oxygen saturation 100.  Patient reports feeling soreness in the abdominal area.  Patient denies chest pain, shortness of breath or difficulty breathing.
No c/o chest pain/sob.   Pt calm.
Patient refusing cruz placement, patient is not retaining. 5ml post void residual. Renal and GYN teams aware at this time. Cruz to be placed in OR tomorrow
Vaginal bleeding.  Patient denies dizziness, chest pains, shortness of breath or difficulty breathing

## 2020-07-10 NOTE — PROGRESS NOTE ADULT - SUBJECTIVE AND OBJECTIVE BOX
Subjective    Seen and examined.  No complaints.    Objective    Vital signs  T(F): , Max: 98.1 (07-10-20 @ 01:32)  HR: 91 (07-10-20 @ 05:12)  BP: 133/77 (07-10-20 @ 05:12)  SpO2: 100% (07-10-20 @ 05:12)  Wt(kg): --    Output     OUT:    Indwelling Catheter - Urethral: 3400 mL  Total OUT: 3400 mL    Total NET: -3400 mL          Physical Exam  Gen NAD  Abd soft nondistended, binder in place   cruz draining clear yellow urine    Labs      07-10 @ 05:30    WBC 12.01 / Hct 24.6  / SCr 2.01     07-09 @ 19:35    WBC 16.39 / Hct 34.2  / SCr --         Urine Cx:   Culture - Urine (07.06.20 @ 23:52)    Specimen Source: .Urine Clean Catch (Midstream)    Culture Results:   <10,000 CFU/mL Normal Urogenital Ale

## 2020-07-10 NOTE — PROGRESS NOTE ADULT - SUBJECTIVE AND OBJECTIVE BOX
ANESTHESIA POSTOP CHECK    44y Female POSTOP DAY 1 S/P COREY bilateral salpingectomy    Vital Signs Last 24 Hrs  T(C): 36.7 (10 Jul 2020 05:12), Max: 36.7 (10 Jul 2020 01:32)  T(F): 98 (10 Jul 2020 05:12), Max: 98.1 (10 Jul 2020 01:32)  HR: 91 (10 Jul 2020 05:12) (88 - 105)  BP: 133/77 (10 Jul 2020 05:12) (130/86 - 155/89)  BP(mean): 109 (09 Jul 2020 17:30) (100 - 111)  RR: 17 (10 Jul 2020 05:12) (13 - 18)  SpO2: 100% (10 Jul 2020 05:12) (95% - 100%)  I&O's Summary    09 Jul 2020 07:01  -  10 Jul 2020 07:00  --------------------------------------------------------  IN: 1050 mL / OUT: 3400 mL / NET: -2350 mL        [x ] NO APPARENT ANESTHESIA COMPLICATIONS      Comments: no complaints as per patient

## 2020-07-10 NOTE — PROVIDER CONTACT NOTE (OTHER) - ACTION/TREATMENT ORDERED:
Team notified.  Patient will be seen in person.  No further orders.  Will continue to monitor.
Continue to monitor urine output
Reassess pt's BP in 1 hr.
Team made aware.  Orders are to activate pain medication and make available to patient.  Will follow orders.  Will continue to monitor.

## 2020-07-10 NOTE — PROGRESS NOTE ADULT - ASSESSMENT
44 year old woman with no significant PMHx who presents to the ED at request of her nephrologist for EDIE/hydronephrosis likely related to fibroid uterus.

## 2020-07-10 NOTE — PROGRESS NOTE ADULT - SUBJECTIVE AND OBJECTIVE BOX
GYN Progress Note    POD#1   HD#4    Patient seen and examined at bedside.  No acute events overnight. No acute concerns.  Pain well controlled with PO pain medications.  Patient is not yet ambulating and tolerating **a clear diet.  Has not yet passed flatus.   Parkinson is still in place.   Denies CP, cough, SOB, N/V, fevers, and chills.    Vital Signs Last 24 Hours  T(C): 36.7 (07-10-20 @ 05:12), Max: 37.7 (07-09-20 @ 10:20)  HR: 91 (07-10-20 @ 05:12) (88 - 105)  BP: 133/77 (07-10-20 @ 05:12) (130/86 - 163/58)  RR: 17 (07-10-20 @ 05:12) (13 - 18)  SpO2: 100% (07-10-20 @ 05:12) (95% - 100%)    I&O's Summary    08 Jul 2020 07:01  -  09 Jul 2020 07:00  --------------------------------------------------------  IN: 1000 mL / OUT: 875 mL / NET: 125 mL    09 Jul 2020 07:01  -  10 Jul 2020 06:51  --------------------------------------------------------  IN: 1050 mL / OUT: 3400 mL / NET: -2350 mL        Physical Exam:  General: NAD  CV: RRR  Lungs: CTAB  Abdomen: soft, appropriately-tender, softly distended, normoactive bowel sounds  Incision: midline incision CDI  : saturated **%  Ext: no pain or swelling     Labs:                        8.1    12.01 )-----------( 198      ( 10 Jul 2020 05:30 )             24.6                          11.0   16.39 )-----------( 242      ( 09 Jul 2020 19:35 )             34.2                9.2    6.49  )-----------( 234      ( 09 Jul 2020 05:09 )             28.7     07-10    138  |  106  |  17  ----------------------------<  120<H>  3.4<L>   |  22  |  2.01<H>    Ca    9.1      10 Jul 2020 05:30  Phos  3.3     07-10  Mg     1.1     07-10      Blood Type: B Positive      MEDICATIONS  (STANDING):  acetaminophen   Tablet .. 975 milliGRAM(s) Oral <User Schedule>  heparin   Injectable 5000 Unit(s) SubCutaneous every 8 hours  ibuprofen  Tablet. 600 milliGRAM(s) Oral every 6 hours  lactated ringers. 1000 milliLiter(s) (125 mL/Hr) IV Continuous <Continuous>    MEDICATIONS  (PRN):  HYDROmorphone  Injectable 0.5 milliGRAM(s) IV Push every 30 minutes PRN Moderate Pain (4 - 6)  ondansetron Injectable 4 milliGRAM(s) IV Push every 20 minutes PRN Nausea and/or Vomiting  oxyCODONE    IR 5 milliGRAM(s) Oral every 3 hours PRN Moderate to Severe Pain (4-10)  oxyCODONE    IR 5 milliGRAM(s) Oral once PRN Moderate to Severe Pain (4-10) GYN Progress Note    POD#1   HD#4    Patient seen and examined at bedside by GYN team.  No acute events overnight. No acute concerns.  Pain well controlled with PO pain medications.  Patient is not yet ambulating and tolerating **a clear diet.  Has not yet passed flatus.   Parkinson is still in place.   Denies CP, cough, SOB, N/V, fevers, and chills.    Vital Signs Last 24 Hours  T(C): 36.7 (07-10-20 @ 05:12), Max: 37.7 (07-09-20 @ 10:20)  HR: 91 (07-10-20 @ 05:12) (88 - 105)  BP: 133/77 (07-10-20 @ 05:12) (130/86 - 163/58)  RR: 17 (07-10-20 @ 05:12) (13 - 18)  SpO2: 100% (07-10-20 @ 05:12) (95% - 100%)    I&O's Summary    08 Jul 2020 07:01  -  09 Jul 2020 07:00  --------------------------------------------------------  IN: 1000 mL / OUT: 875 mL / NET: 125 mL    09 Jul 2020 07:01  -  10 Jul 2020 06:51  --------------------------------------------------------  IN: 1050 mL / OUT: 3400 mL / NET: -2350 mL        Physical Exam:  General: NAD  CV: RRR  Lungs: CTAB  Abdomen: soft, appropriately-tender, softly distended, normoactive bowel sounds  Incision: midline incision CDI  : saturated **%  Ext: no pain or swelling     Labs:                        8.1    12.01 )-----------( 198      ( 10 Jul 2020 05:30 )             24.6                          11.0   16.39 )-----------( 242      ( 09 Jul 2020 19:35 )             34.2                9.2    6.49  )-----------( 234      ( 09 Jul 2020 05:09 )             28.7     07-10    138  |  106  |  17  ----------------------------<  120<H>  3.4<L>   |  22  |  2.01<H>    Ca    9.1      10 Jul 2020 05:30  Phos  3.3     07-10  Mg     1.1     07-10      Blood Type: B Positive      MEDICATIONS  (STANDING):  acetaminophen   Tablet .. 975 milliGRAM(s) Oral <User Schedule>  heparin   Injectable 5000 Unit(s) SubCutaneous every 8 hours  ibuprofen  Tablet. 600 milliGRAM(s) Oral every 6 hours  lactated ringers. 1000 milliLiter(s) (125 mL/Hr) IV Continuous <Continuous>    MEDICATIONS  (PRN):  HYDROmorphone  Injectable 0.5 milliGRAM(s) IV Push every 30 minutes PRN Moderate Pain (4 - 6)  ondansetron Injectable 4 milliGRAM(s) IV Push every 20 minutes PRN Nausea and/or Vomiting  oxyCODONE    IR 5 milliGRAM(s) Oral every 3 hours PRN Moderate to Severe Pain (4-10)  oxyCODONE    IR 5 milliGRAM(s) Oral once PRN Moderate to Severe Pain (4-10) GYN Progress Note    POD#1   HD#4    Patient seen and examined at bedside by GYN team.  No acute events overnight. No acute concerns.  Pain well controlled with PO pain medications.  Patient is not yet ambulating and tolerating **a clear diet.  Has not yet passed flatus.   Parkinson is still in place.   Denies CP, cough, SOB, N/V, fevers, and chills.    Vital Signs Last 24 Hours  T(C): 36.7 (07-10-20 @ 05:12), Max: 37.7 (07-09-20 @ 10:20)  HR: 91 (07-10-20 @ 05:12) (88 - 105)  BP: 133/77 (07-10-20 @ 05:12) (130/86 - 163/58)  RR: 17 (07-10-20 @ 05:12) (13 - 18)  SpO2: 100% (07-10-20 @ 05:12) (95% - 100%)    I&O's Summary    08 Jul 2020 07:01  -  09 Jul 2020 07:00  --------------------------------------------------------  IN: 1000 mL / OUT: 875 mL / NET: 125 mL    09 Jul 2020 07:01  -  10 Jul 2020 06:51  --------------------------------------------------------  IN: 1050 mL / OUT: 3400 mL / NET: -2350 mL        Physical Exam:  General: NAD  CV: RRR  Lungs: CTAB  Abdomen: soft, appropriately-tender, softly distended, normoactive bowel sounds  Incision: midline incision CDI  : minimal spotting  Ext: no pain or swelling     Labs:                        8.1    12.01 )-----------( 198      ( 10 Jul 2020 05:30 )             24.6                          11.0   16.39 )-----------( 242      ( 09 Jul 2020 19:35 )             34.2                9.2    6.49  )-----------( 234      ( 09 Jul 2020 05:09 )             28.7     07-10    138  |  106  |  17  ----------------------------<  120<H>  3.4<L>   |  22  |  2.01<H>    Ca    9.1      10 Jul 2020 05:30  Phos  3.3     07-10  Mg     1.1     07-10      Blood Type: B Positive      MEDICATIONS  (STANDING):  acetaminophen   Tablet .. 975 milliGRAM(s) Oral <User Schedule>  heparin   Injectable 5000 Unit(s) SubCutaneous every 8 hours  ibuprofen  Tablet. 600 milliGRAM(s) Oral every 6 hours  lactated ringers. 1000 milliLiter(s) (125 mL/Hr) IV Continuous <Continuous>    MEDICATIONS  (PRN):  HYDROmorphone  Injectable 0.5 milliGRAM(s) IV Push every 30 minutes PRN Moderate Pain (4 - 6)  ondansetron Injectable 4 milliGRAM(s) IV Push every 20 minutes PRN Nausea and/or Vomiting  oxyCODONE    IR 5 milliGRAM(s) Oral every 3 hours PRN Moderate to Severe Pain (4-10)  oxyCODONE    IR 5 milliGRAM(s) Oral once PRN Moderate to Severe Pain (4-10) GYN Progress Note    POD#1   HD#4    Patient seen and examined at bedside by GYN team.  No acute events overnight. No acute concerns.  Pain well controlled with PO pain medications.  Patient is not yet ambulating and tolerating a clear diet.  Has not yet passed flatus.   Parkinson is still in place.   Denies CP, cough, SOB, N/V, fevers, and chills.    Vital Signs Last 24 Hours  T(C): 36.7 (07-10-20 @ 05:12), Max: 37.7 (07-09-20 @ 10:20)  HR: 91 (07-10-20 @ 05:12) (88 - 105)  BP: 133/77 (07-10-20 @ 05:12) (130/86 - 163/58)  RR: 17 (07-10-20 @ 05:12) (13 - 18)  SpO2: 100% (07-10-20 @ 05:12) (95% - 100%)    I&O's Summary    08 Jul 2020 07:01  -  09 Jul 2020 07:00  --------------------------------------------------------  IN: 1000 mL / OUT: 875 mL / NET: 125 mL    09 Jul 2020 07:01  -  10 Jul 2020 06:51  --------------------------------------------------------  IN: 1050 mL / OUT: 3400 mL / NET: -2350 mL        Physical Exam:  General: NAD  CV: RRR  Lungs: CTAB  Abdomen: soft, appropriately-tender, softly distended, normoactive bowel sounds  Incision: midline incision CDI  : minimal spotting  Ext: no pain or swelling     Labs:                        8.1    12.01 )-----------( 198      ( 10 Jul 2020 05:30 )             24.6                          11.0   16.39 )-----------( 242      ( 09 Jul 2020 19:35 )             34.2                9.2    6.49  )-----------( 234      ( 09 Jul 2020 05:09 )             28.7     07-10    138  |  106  |  17  ----------------------------<  120<H>  3.4<L>   |  22  |  2.01<H>    Ca    9.1      10 Jul 2020 05:30  Phos  3.3     07-10  Mg     1.1     07-10      Blood Type: B Positive      MEDICATIONS  (STANDING):  acetaminophen   Tablet .. 975 milliGRAM(s) Oral <User Schedule>  heparin   Injectable 5000 Unit(s) SubCutaneous every 8 hours  ibuprofen  Tablet. 600 milliGRAM(s) Oral every 6 hours  lactated ringers. 1000 milliLiter(s) (125 mL/Hr) IV Continuous <Continuous>    MEDICATIONS  (PRN):  HYDROmorphone  Injectable 0.5 milliGRAM(s) IV Push every 30 minutes PRN Moderate Pain (4 - 6)  ondansetron Injectable 4 milliGRAM(s) IV Push every 20 minutes PRN Nausea and/or Vomiting  oxyCODONE    IR 5 milliGRAM(s) Oral every 3 hours PRN Moderate to Severe Pain (4-10)  oxyCODONE    IR 5 milliGRAM(s) Oral once PRN Moderate to Severe Pain (4-10)

## 2020-07-10 NOTE — PROGRESS NOTE ADULT - PROBLEM SELECTOR PLAN 1
EDIE likely obstructive with severe b/l hydronephrosis likely 2/2 large fibroid uterus compressing ureters  -pt asymptomatic, gyn consulted, she is s/p Total Abdominal Hysterectomy, Bilateral Salpingectomy 7/9/20  - s/p 1 unit PRBC. stable hgb  - Cr stable. will monitor post op Cr, expected to improve   - GYN and renal eval appreciated.  - supplement electrolytes  - cruz in place, monitor urine output

## 2020-07-10 NOTE — PROGRESS NOTE ADULT - ASSESSMENT
44yoF with b/l hydronephrosis, EDIE 2/2 massively enlarged uterus, s/p abdominal hysterectomy (7/9).    - Continue to trend Cr - 2.01 today from 2.25 yesterday  - Care per Gyn

## 2020-07-10 NOTE — PROGRESS NOTE ADULT - SUBJECTIVE AND OBJECTIVE BOX
Patient is a 44y old  Female who presents with a chief complaint of hydronephrosis (10 Jul 2020 14:34)      SUBJECTIVE / OVERNIGHT EVENTS:  doing well.   +flatus  no BM yet  tolerating diet  stable hgb  no melena, no hematochezia. no BRBPR.   no n/v/d. no abdominal pain.   pain controlled.         Vital Signs Last 24 Hrs  T(C): 37 (10 Jul 2020 17:31), Max: 37.3 (10 Jul 2020 10:19)  T(F): 98.6 (10 Jul 2020 17:31), Max: 99.2 (10 Jul 2020 10:19)  HR: 98 (10 Jul 2020 18:15) (91 - 105)  BP: 147/82 (10 Jul 2020 17:31) (130/71 - 155/89)  BP(mean): --  RR: 17 (10 Jul 2020 17:31) (16 - 19)  SpO2: 100% (10 Jul 2020 17:31) (98% - 100%)  I&O's Summary    09 Jul 2020 07:01  -  10 Jul 2020 07:00  --------------------------------------------------------  IN: 1050 mL / OUT: 3400 mL / NET: -2350 mL    10 Jul 2020 07:01  -  10 Jul 2020 19:04  --------------------------------------------------------  IN: 1000 mL / OUT: 3235 mL / NET: -2235 mL        PHYSICAL EXAM:  GENERAL: NAD, Comfortable  HEAD:  Atraumatic, Normocephalic  EYES: EOMI, PERRLA, conjunctiva and sclera clear  NECK: Supple, No JVD  CHEST/LUNG: Clear to auscultation bilaterally; No wheeze  HEART: Regular rate and rhythm; No murmurs, rubs, or gallops  ABDOMEN: Soft, appropriate post surgical tenderenss, Nondistended; Bowel sounds present, dressing d/c/i, abd binder in place  Neuro: AAO x 3, no focal deficit, 5/5 b/l extremities  EXTREMITIES:  2+ Peripheral Pulses, No clubbing, cyanosis, or edema  SKIN: No rashes or lesions      LABS:                        8.4    10.51 )-----------( 186      ( 10 Jul 2020 15:30 )             25.9     07-10    140  |  105  |  17  ----------------------------<  112<H>  3.6   |  22  |  2.04<H>    Ca    8.9      10 Jul 2020 15:30  Phos  2.8     07-10  Mg     1.5     07-10        CAPILLARY BLOOD GLUCOSE                RADIOLOGY & ADDITIONAL TESTS:    Imaging Personally Reviewed:  [x] YES  [ ] NO    Consultant(s) Notes Reviewed:  [x] YES  [ ] NO      MEDICATIONS  (STANDING):  acetaminophen   Tablet .. 975 milliGRAM(s) Oral <User Schedule>  heparin   Injectable 5000 Unit(s) SubCutaneous every 8 hours  simethicone 80 milliGRAM(s) Chew daily    MEDICATIONS  (PRN):  ondansetron Injectable 4 milliGRAM(s) IV Push every 20 minutes PRN Nausea and/or Vomiting  oxyCODONE    IR 5 milliGRAM(s) Oral every 3 hours PRN Moderate to Severe Pain (4-10)  oxyCODONE    IR 5 milliGRAM(s) Oral once PRN Moderate to Severe Pain (4-10)      Care Discussed with Consultants/Other Providers [x] YES  [ ] NO    HEALTH ISSUES - PROBLEM Dx:  Need for prophylactic measure: Need for prophylactic measure  Fibroid: Fibroid  Acute kidney injury: Acute kidney injury

## 2020-07-10 NOTE — PROGRESS NOTE ADULT - ASSESSMENT
A/P: 44y POD#1 s/p Total Abdominal Hysterectomy, Bilateral Salpingectomy that was uncomplicated.  Patient is stable and doing well.      1) Neuro:   #Post-op pain:  - Scheduled tylenol, Motrin  - Oxycodone prn  - D/C Dilaudid IV PRN    2) CV/Resp:   #Acute blood loss from surgery: Pre-op H/H 9.2/28.7. EBL 3750 s/p 1UpRBC Hemodynamically stable. POD#1 AM CBC H/H 8.1/24.6. No signs of postop hemorrhage.   - f/u repeat PM CBC    3) GI/FEN:   GI: Continue Zofran prn  F: LR @ 125. Saline lock IV when tolerating PO.  E: replete electrolytes PRN  N: Advance diet as tolerated    4) : Hydronephrosis. Crt downtrending (2.25--> 2.01)  - UOP adequate  - Continue strict I/Os to monitor for post obstructive diuresis     5) Gyn:   - F/u final path    6) ID: no active issues. afebrile.     7) Endo: no active issues.     8) Prophy: c/w SubQ heparin and SCDs. Encourage ambulation and IS.    9) Dispo: when tolerating PO, pain well-controlled on PO meds, voiding and ambulating.     Carole Bush MD, MS   PGY-1    to be D/W A/P: 44y POD#1 s/p Total Abdominal Hysterectomy, Bilateral Salpingectomy that was uncomplicated.  Patient is stable and doing well.      1) Neuro:   #Post-op pain:  - Scheduled tylenol, Motrin  - Oxycodone prn  - D/C Dilaudid IV PRN    2) CV/Resp:   #Acute blood loss from surgery: Pre-op H/H 9.2/28.7. EBL 3750 s/p 1UpRBC Hemodynamically stable. POD#1 AM CBC H/H 8.1/24.6.  - f/u repeat PM CBC    3) GI/FEN:   GI: Continue Zofran prn  F: LR @ 125. Saline lock IV when tolerating PO.  E: replete electrolytes PRN  N: Advance diet as tolerated    4) : Hydronephrosis. Crt downtrending (2.25--> 2.01)  - UOP adequate  - Continue strict I/Os to monitor for post obstructive diuresis     5) Gyn:   - F/u final path    6) ID: no active issues. afebrile.     7) Endo: no active issues.     8) Prophy: c/w SubQ heparin and SCDs. Encourage ambulation and IS.    9) Dispo: continue routine inpatient care     Carole Bush MD, MS   PGY-1        Patient seen and evaluated with PGY1  Patient stable, meeting appropriate postop milestones  EDIE improving, UOP adequate  Will replete electrolytes today  Appreciate medicine, nephrology, and urology contributions to care    Audrey Zelaya PGY3 A/P: 44y POD#1 s/p Total Abdominal Hysterectomy, Bilateral Salpingectomy that was uncomplicated.  Patient is stable and doing well.      1) Neuro:   #Post-op pain:  - Scheduled tylenol, Motrin  - Oxycodone prn  - D/C Dilaudid IV PRN    2) CV/Resp:   #Acute blood loss from surgery: Pre-op H/H 9.2/28.7. EBL 3750 s/p 1UpRBC Hemodynamically stable. POD#1 AM CBC H/H 8.1/24.6.  - f/u repeat PM CBC, BMP    3) GI/FEN:   GI: Continue Zofran prn  F: LR @ 125. Saline lock IV when tolerating PO.  E: replete electrolytes PRN  N: Advance diet as tolerated    4) : Hydronephrosis. EDIE improving with Crt downtrending (2.25--> 2.01)  - UOP adequate  - Continue strict I/Os to monitor for post obstructive diuresis   - Appreciate recs from medicine, nephrology, urology. Thank you.    5) Gyn:   - F/u final path    6) ID: no active issues. afebrile.     7) Endo: no active issues.     8) Prophy: c/w SubQ heparin and SCDs. Encourage ambulation and IS.    9) Dispo: continue routine inpatient care     Carole Bush MD, MS   PGY-1        Patient seen and evaluated with PGY1  Patient stable, meeting appropriate postop milestones  EDIE improving, UOP adequate  Will replete electrolytes today  Appreciate medicine, nephrology, and urology contributions to care    Audrey Zelaya PGY3

## 2020-07-10 NOTE — PROGRESS NOTE ADULT - SUBJECTIVE AND OBJECTIVE BOX
[facility]   MD AUDREY TOVAR DO ANAM SIDDIQUI ANGELA WONG, PA    TEL:  OFFICE: [phone]  DR ENCARNACION CELL: [phone]  DR. JOE CELL: [phone]  DR. CHA CELL: [phone]  MARY HANSEN CELL: [phone]    From 5pm-7am Answering Service [phone]    Patient is a 44y old  Female who presents with a chief complaint of hydronephrosis (10 Jul 2020 11:00)      Patient seen and examined at bedside. No chest pain/sob    VITALS:  T(F): 98.9 (07-10-20 @ 14:17), Max: 99.2 (07-10-20 @ 10:19)  HR: 100 (07-10-20 @ 14:17)  BP: 130/71 (07-10-20 @ 14:17)  RR: 19 (07-10-20 @ 14:17)  SpO2: 100% (07-10-20 @ 14:17)  Wt(kg): --    07-09 @ 07:01  -  07-10 @ 07:00  --------------------------------------------------------  IN: 1050 mL / OUT: 3400 mL / NET: -2350 mL    07-10 @ 07:01  -  07-10 @ 14:34  --------------------------------------------------------  IN: 0 mL / OUT: 2300 mL / NET: -2300 mL          PHYSICAL EXAM:  Constitutional: NAD  Neck: No JVD  Respiratory: CTAB, no wheezes, rales or rhonchi  Cardiovascular: S1, S2, RRR  Gastrointestinal: abd binding d/c/i  Extremities: No peripheral edema    Hospital Medications:   MEDICATIONS  (STANDING):  acetaminophen   Tablet .. 975 milliGRAM(s) Oral <User Schedule>  heparin   Injectable 5000 Unit(s) SubCutaneous every 8 hours  ibuprofen  Tablet. 600 milliGRAM(s) Oral every 6 hours  lactated ringers. 1000 milliLiter(s) (125 mL/Hr) IV Continuous <Continuous>      LABS:  07-10    138  |  106  |  17  ----------------------------<  120<H>  3.4<L>   |  22  |  2.01<H>    Ca    9.1      10 Jul 2020 05:30  Phos  3.3     07-10  Mg     1.1     07-10      Creatinine Trend: 2.01 <--, 2.25 <--, 2.17 <--, 2.00 <--, 2.36 <--    Phosphorus Level, Serum: 3.3 mg/dL (07-10 @ 05:30)                              8.1    12.01 )-----------( 198      ( 10 Jul 2020 05:30 )             24.6     Urine Studies:  Urinalysis - [07-06-20 @ 21:41]      Color COLORLESS / Appearance CLEAR / SG 1.010 / pH 6.0      Gluc NEGATIVE / Ketone NEGATIVE  / Bili NEGATIVE / Urobili NORMAL       Blood NEGATIVE / Protein NEGATIVE / Leuk Est SMALL / Nitrite NEGATIVE      RBC 0-2 / WBC 6-10 / Hyaline NEGATIVE / Gran  / Sq Epi OCC / Non Sq Epi  / Bacteria NEGATIVE    Urine Creatinine 45.70      [07-07-20 @ 03:10]  Urine Sodium 61      [07-07-20 @ 03:10]  Urine Potassium 14.4      [07-07-20 @ 03:10]  Urine Chloride 45      [07-07-20 @ 03:10]          RADIOLOGY & ADDITIONAL STUDIES:

## 2020-07-11 LAB
ALBUMIN SERPL ELPH-MCNC: 3.5 G/DL — SIGNIFICANT CHANGE UP (ref 3.3–5)
ALP SERPL-CCNC: 48 U/L — SIGNIFICANT CHANGE UP (ref 40–120)
ALT FLD-CCNC: 5 U/L — SIGNIFICANT CHANGE UP (ref 4–33)
ANION GAP SERPL CALC-SCNC: 12 MMO/L — SIGNIFICANT CHANGE UP (ref 7–14)
ANION GAP SERPL CALC-SCNC: 13 MMO/L — SIGNIFICANT CHANGE UP (ref 7–14)
AST SERPL-CCNC: 16 U/L — SIGNIFICANT CHANGE UP (ref 4–32)
BILIRUB SERPL-MCNC: < 0.2 MG/DL — LOW (ref 0.2–1.2)
BUN SERPL-MCNC: 15 MG/DL — SIGNIFICANT CHANGE UP (ref 7–23)
BUN SERPL-MCNC: 15 MG/DL — SIGNIFICANT CHANGE UP (ref 7–23)
CALCIUM SERPL-MCNC: 9 MG/DL — SIGNIFICANT CHANGE UP (ref 8.4–10.5)
CALCIUM SERPL-MCNC: 9.5 MG/DL — SIGNIFICANT CHANGE UP (ref 8.4–10.5)
CHLORIDE SERPL-SCNC: 102 MMOL/L — SIGNIFICANT CHANGE UP (ref 98–107)
CHLORIDE SERPL-SCNC: 105 MMOL/L — SIGNIFICANT CHANGE UP (ref 98–107)
CO2 SERPL-SCNC: 24 MMOL/L — SIGNIFICANT CHANGE UP (ref 22–31)
CO2 SERPL-SCNC: 24 MMOL/L — SIGNIFICANT CHANGE UP (ref 22–31)
CREAT SERPL-MCNC: 1.85 MG/DL — HIGH (ref 0.5–1.3)
CREAT SERPL-MCNC: 1.9 MG/DL — HIGH (ref 0.5–1.3)
GLUCOSE SERPL-MCNC: 109 MG/DL — HIGH (ref 70–99)
GLUCOSE SERPL-MCNC: 122 MG/DL — HIGH (ref 70–99)
HCT VFR BLD CALC: 25.8 % — LOW (ref 34.5–45)
HCT VFR BLD CALC: 28.1 % — LOW (ref 34.5–45)
HGB BLD-MCNC: 8.3 G/DL — LOW (ref 11.5–15.5)
HGB BLD-MCNC: 9.4 G/DL — LOW (ref 11.5–15.5)
MAGNESIUM SERPL-MCNC: 1.4 MG/DL — LOW (ref 1.6–2.6)
MCHC RBC-ENTMCNC: 25.5 PG — LOW (ref 27–34)
MCHC RBC-ENTMCNC: 26.6 PG — LOW (ref 27–34)
MCHC RBC-ENTMCNC: 32.2 % — SIGNIFICANT CHANGE UP (ref 32–36)
MCHC RBC-ENTMCNC: 33.5 % — SIGNIFICANT CHANGE UP (ref 32–36)
MCV RBC AUTO: 79.1 FL — LOW (ref 80–100)
MCV RBC AUTO: 79.4 FL — LOW (ref 80–100)
NRBC # FLD: 0 K/UL — SIGNIFICANT CHANGE UP (ref 0–0)
NRBC # FLD: 0 K/UL — SIGNIFICANT CHANGE UP (ref 0–0)
PHOSPHATE SERPL-MCNC: 3.1 MG/DL — SIGNIFICANT CHANGE UP (ref 2.5–4.5)
PLATELET # BLD AUTO: 218 K/UL — SIGNIFICANT CHANGE UP (ref 150–400)
PLATELET # BLD AUTO: 219 K/UL — SIGNIFICANT CHANGE UP (ref 150–400)
PMV BLD: 10.5 FL — SIGNIFICANT CHANGE UP (ref 7–13)
PMV BLD: 10.7 FL — SIGNIFICANT CHANGE UP (ref 7–13)
POTASSIUM SERPL-MCNC: 3.3 MMOL/L — LOW (ref 3.5–5.3)
POTASSIUM SERPL-MCNC: 3.8 MMOL/L — SIGNIFICANT CHANGE UP (ref 3.5–5.3)
POTASSIUM SERPL-SCNC: 3.3 MMOL/L — LOW (ref 3.5–5.3)
POTASSIUM SERPL-SCNC: 3.8 MMOL/L — SIGNIFICANT CHANGE UP (ref 3.5–5.3)
PROT SERPL-MCNC: 6.7 G/DL — SIGNIFICANT CHANGE UP (ref 6–8.3)
RBC # BLD: 3.26 M/UL — LOW (ref 3.8–5.2)
RBC # BLD: 3.54 M/UL — LOW (ref 3.8–5.2)
RBC # FLD: 14.6 % — HIGH (ref 10.3–14.5)
RBC # FLD: 15.2 % — HIGH (ref 10.3–14.5)
SODIUM SERPL-SCNC: 138 MMOL/L — SIGNIFICANT CHANGE UP (ref 135–145)
SODIUM SERPL-SCNC: 142 MMOL/L — SIGNIFICANT CHANGE UP (ref 135–145)
WBC # BLD: 11.55 K/UL — HIGH (ref 3.8–10.5)
WBC # BLD: 11.82 K/UL — HIGH (ref 3.8–10.5)
WBC # FLD AUTO: 11.55 K/UL — HIGH (ref 3.8–10.5)
WBC # FLD AUTO: 11.82 K/UL — HIGH (ref 3.8–10.5)

## 2020-07-11 RX ORDER — POTASSIUM CHLORIDE 20 MEQ
40 PACKET (EA) ORAL ONCE
Refills: 0 | Status: COMPLETED | OUTPATIENT
Start: 2020-07-11 | End: 2020-07-11

## 2020-07-11 RX ORDER — MAGNESIUM SULFATE 500 MG/ML
2 VIAL (ML) INJECTION ONCE
Refills: 0 | Status: COMPLETED | OUTPATIENT
Start: 2020-07-11 | End: 2020-07-11

## 2020-07-11 RX ORDER — CALCIUM CARBONATE 500(1250)
1 TABLET ORAL EVERY 6 HOURS
Refills: 0 | Status: DISCONTINUED | OUTPATIENT
Start: 2020-07-11 | End: 2020-07-13

## 2020-07-11 RX ORDER — MAGNESIUM SULFATE 500 MG/ML
2 VIAL (ML) INJECTION ONCE
Refills: 0 | Status: DISCONTINUED | OUTPATIENT
Start: 2020-07-11 | End: 2020-07-11

## 2020-07-11 RX ORDER — TRAMADOL HYDROCHLORIDE 50 MG/1
50 TABLET ORAL EVERY 6 HOURS
Refills: 0 | Status: DISCONTINUED | OUTPATIENT
Start: 2020-07-11 | End: 2020-07-13

## 2020-07-11 RX ORDER — POTASSIUM CHLORIDE 20 MEQ
40 PACKET (EA) ORAL EVERY 4 HOURS
Refills: 0 | Status: DISCONTINUED | OUTPATIENT
Start: 2020-07-11 | End: 2020-07-11

## 2020-07-11 RX ORDER — SIMETHICONE 80 MG/1
80 TABLET, CHEWABLE ORAL EVERY 4 HOURS
Refills: 0 | Status: DISCONTINUED | OUTPATIENT
Start: 2020-07-11 | End: 2020-07-13

## 2020-07-11 RX ADMIN — Medication 1 TABLET(S): at 02:20

## 2020-07-11 RX ADMIN — SIMETHICONE 80 MILLIGRAM(S): 80 TABLET, CHEWABLE ORAL at 22:31

## 2020-07-11 RX ADMIN — Medication 50 GRAM(S): at 10:54

## 2020-07-11 RX ADMIN — TRAMADOL HYDROCHLORIDE 50 MILLIGRAM(S): 50 TABLET ORAL at 02:21

## 2020-07-11 RX ADMIN — Medication 975 MILLIGRAM(S): at 06:58

## 2020-07-11 RX ADMIN — HEPARIN SODIUM 5000 UNIT(S): 5000 INJECTION INTRAVENOUS; SUBCUTANEOUS at 06:58

## 2020-07-11 RX ADMIN — SIMETHICONE 80 MILLIGRAM(S): 80 TABLET, CHEWABLE ORAL at 06:58

## 2020-07-11 RX ADMIN — Medication 975 MILLIGRAM(S): at 22:30

## 2020-07-11 RX ADMIN — Medication 975 MILLIGRAM(S): at 13:10

## 2020-07-11 RX ADMIN — HEPARIN SODIUM 5000 UNIT(S): 5000 INJECTION INTRAVENOUS; SUBCUTANEOUS at 22:31

## 2020-07-11 RX ADMIN — TRAMADOL HYDROCHLORIDE 50 MILLIGRAM(S): 50 TABLET ORAL at 11:03

## 2020-07-11 RX ADMIN — SIMETHICONE 80 MILLIGRAM(S): 80 TABLET, CHEWABLE ORAL at 01:49

## 2020-07-11 RX ADMIN — Medication 40 MILLIEQUIVALENT(S): at 10:54

## 2020-07-11 RX ADMIN — Medication 975 MILLIGRAM(S): at 18:24

## 2020-07-11 RX ADMIN — HEPARIN SODIUM 5000 UNIT(S): 5000 INJECTION INTRAVENOUS; SUBCUTANEOUS at 13:11

## 2020-07-11 NOTE — PROGRESS NOTE ADULT - SUBJECTIVE AND OBJECTIVE BOX
Section POD#2  Pt with c/o of severe dizziness and feels like she is going to pass out; +Flatus; no BM yet    Physical exam:    Vital Signs Last 24 Hrs  T(C): 37.2 (2020 06:52), Max: 37.3 (10 Jul 2020 10:19)  T(F): 99 (2020 06:52), Max: 99.2 (10 Jul 2020 10:19)  HR: 102 (2020 06:52) (91 - 105)  BP: 130/79 (2020 06:52) (130/71 - 147/85)    Gen: NAD  Abdomen: Soft, NT/ND; BS+  Incision: Clean, dry, and intact with steri strips; No edema; No erythema; No purulence  Ext: No C/C/E, no calf pain    LABS:                        8.3    11.55 )-----------( 218      ( 2020 05:46 )             25.8                         8.4    10.51 )-----------( 186      ( 10 Jul 2020 15:30 )             25.9                         8.1    12.01 )-----------( 198      ( 10 Jul 2020 05:30 )             24.6                   A/P Pt S/P COREY/BS POD#2.  Pt with symptomatic anemia however CBC is stable.    Will  1) Tx with one unit PRBC  2) Continue with post-op care  3) Discharge Planning  4) Repeat CBC in am    Will follow    ZA Corado POD#2  Pt with c/o of severe dizziness and feels like she is going to pass out; +Flatus; no BM yet    Physical exam:    Vital Signs Last 24 Hrs  T(C): 37.2 (11 Jul 2020 06:52), Max: 37.3 (10 Jul 2020 10:19)  T(F): 99 (11 Jul 2020 06:52), Max: 99.2 (10 Jul 2020 10:19)  HR: 102 (11 Jul 2020 06:52) (91 - 105)  BP: 130/79 (11 Jul 2020 06:52) (130/71 - 147/85)    Gen: NAD  Abdomen: Soft, NT/ND; BS+  Incision: Clean, dry, and intact with steri strips; No edema; No erythema; No purulence  Ext: No C/C/E, no calf pain    LABS:                        8.3    11.55 )-----------( 218      ( 11 Jul 2020 05:46 )             25.8                         8.4    10.51 )-----------( 186      ( 10 Jul 2020 15:30 )             25.9                         8.1    12.01 )-----------( 198      ( 10 Jul 2020 05:30 )             24.6                   A/P Pt S/P COREY/BS POD#2.  Pt with symptomatic anemia however CBC is stable.    Will  1) Tx with one unit PRBC  2) Continue with post-op care  3) Discharge Planning  4) Repeat CBC in am    Will follow    ZA Corado

## 2020-07-11 NOTE — PROGRESS NOTE ADULT - ASSESSMENT
44 year old woman with no significant pmhx who presents to the ED at request of her nephrologist for EDIE/hydronephrosis likely related to fibroid uterus.    EDIE  Scr 1.08 1 year ago  EDIE likely sec to obstruction  CT with b/l hydro  f/u  and GYN  s/p surgery 7/9  renal function slightly improving   monitor bmp  avoid nephrotoxic agents    Elevated BP without diagnosis of HTN   bp now controlled  not on meds  continue to monitor    Acidosis  non AG  improved   monitor     Hypomagnesemia  supplement mg sul today  keep mg>2  monitor    Hypokalemia  supplement  kcl 40meq one dose today  keep k>4  monitor

## 2020-07-11 NOTE — PROGRESS NOTE ADULT - PROBLEM SELECTOR PLAN 1
EDIE likely obstructive with severe b/l hydronephrosis likely 2/2 large fibroid uterus compressing ureters  -pt asymptomatic, gyn consulted, she is s/p Total Abdominal Hysterectomy, Bilateral Salpingectomy 7/9/20  - s/p 2 unit +1 unit PRBC. post transfusion CBC  - Cr stable. will monitor post op Cr, expected to improve. Cr 1.9 today  - GYN and renal eval appreciated.  - supplement electrolytes  - cruz in place, monitor urine output

## 2020-07-11 NOTE — PROGRESS NOTE ADULT - SUBJECTIVE AND OBJECTIVE BOX
Patient is a 44y old  Female who presents with a chief complaint of hydronephrosis (11 Jul 2020 12:05)      SUBJECTIVE / OVERNIGHT EVENTS:  s/p PRBC for anemia and mild lightheadedness  gas pain this am, now better  no cp, no sob, no n/v/d. no abdominal pain.  no headache, no dizziness.   no melena, no hematochezia. no BRBPR.           Vital Signs Last 24 Hrs  T(C): 36.4 (11 Jul 2020 14:15), Max: 37.2 (11 Jul 2020 06:52)  T(F): 97.6 (11 Jul 2020 14:15), Max: 99 (11 Jul 2020 06:52)  HR: 87 (11 Jul 2020 14:15) (87 - 105)  BP: 135/86 (11 Jul 2020 14:15) (130/79 - 147/85)  BP(mean): --  RR: 18 (11 Jul 2020 14:15) (17 - 18)  SpO2: 100% (11 Jul 2020 14:15) (96% - 100%)  I&O's Summary    10 Jul 2020 07:01  -  11 Jul 2020 07:00  --------------------------------------------------------  IN: 1240 mL / OUT: 5285 mL / NET: -4045 mL    11 Jul 2020 07:01  -  11 Jul 2020 17:12  --------------------------------------------------------  IN: 500 mL / OUT: 600 mL / NET: -100 mL      PHYSICAL EXAM:  GENERAL: NAD, Comfortable  HEAD:  Atraumatic, Normocephalic  EYES: EOMI, PERRLA, conjunctiva and sclera clear  NECK: Supple, No JVD  CHEST/LUNG: Clear to auscultation bilaterally; No wheeze  HEART: Regular rate and rhythm; No murmurs, rubs, or gallops  ABDOMEN: Soft, appropriate post surgical tenderenss, Nondistended; Bowel sounds present, dressing d/c/i, abd binder in place  Neuro: AAO x 3, no focal deficit, 5/5 b/l extremities  EXTREMITIES:  2+ Peripheral Pulses, No clubbing, cyanosis, or edema  SKIN: No rashes or lesions      LABS:                        8.3    11.55 )-----------( 218      ( 11 Jul 2020 05:46 )             25.8     07-11    142  |  105  |  15  ----------------------------<  109<H>  3.3<L>   |  24  |  1.90<H>    Ca    9.5      11 Jul 2020 05:46  Phos  3.1     07-11  Mg     1.4     07-11        CAPILLARY BLOOD GLUCOSE                RADIOLOGY & ADDITIONAL TESTS:    Imaging Personally Reviewed:  [x] YES  [ ] NO    Consultant(s) Notes Reviewed:  [x] YES  [ ] NO      MEDICATIONS  (STANDING):  acetaminophen   Tablet .. 975 milliGRAM(s) Oral <User Schedule>  heparin   Injectable 5000 Unit(s) SubCutaneous every 8 hours    MEDICATIONS  (PRN):  calcium carbonate    500 mG (Tums) Chewable 1 Tablet(s) Chew every 6 hours PRN Dyspepsia  ondansetron Injectable 4 milliGRAM(s) IV Push every 20 minutes PRN Nausea and/or Vomiting  oxyCODONE    IR 5 milliGRAM(s) Oral once PRN Moderate to Severe Pain (4-10)  simethicone 80 milliGRAM(s) Chew every 4 hours PRN Gas  traMADol 50 milliGRAM(s) Oral every 6 hours PRN Moderate Pain (4 - 6) to Severe Pain (7-10)      Care Discussed with Consultants/Other Providers [x] YES  [ ] NO    HEALTH ISSUES - PROBLEM Dx:  Need for prophylactic measure: Need for prophylactic measure  Fibroid: Fibroid  Acute kidney injury: Acute kidney injury

## 2020-07-11 NOTE — CHART NOTE - NSCHARTNOTEFT_GEN_A_CORE
Pt seen and evaluated due to notification by RN that patient awoke with pain.  At bedside patient reporting severe pain.  Pt states has had 6 episodes Pt seen and evaluated due to notification by RN that patient awoke with pain.  At bedside patient reporting severe pain.  Pt states has had 6 episodes of flatus but feels gas pain and need to pas further gas without ability to.  Pt denies nausea or vomiting.  Pt states she was up ambulating earlier in the day.  Tolerating PO intake.  Chewing gum to help alleivate gas pain.    Vital Signs Last 24 Hours  T(C): 36.5 (07-11-20 @ 01:49), Max: 37.3 (07-10-20 @ 10:19)  HR: 97 (07-11-20 @ 02:25) (91 - 105)  BP: 147/85 (07-11-20 @ 01:49) (130/71 - 147/85)  RR: 18 (07-11-20 @ 01:49) (17 - 19)  SpO2: 99% (07-11-20 @ 01:49) (98% - 100%)    Gen: intermittent waves of discomfort  CV: RRR, S1/S2  Pulm: CTABL  Abd: mod distention, soft, bowel sounds present    A/P: 43 yo POD#2 s/p abdominal hysterectomy for fibroids w/ abdominal pain consistent w/ gas pain  -simethicone q4h prn  -switch from oxycodone to tramadol to lessen constipation  -encourage to ambulate  -PO intake of water, avoid carbonated beverages    d/w Dr. Dino Medina PGY2

## 2020-07-11 NOTE — PROGRESS NOTE ADULT - SUBJECTIVE AND OBJECTIVE BOX
[facility] [place] [facility]   MD RM TOVAR MD RUORU WONG, PA    TEL:  OFFICE: [phone]  DR ENCARNACION CELL: [phone]  WILLIAMS HANSEN CELL: [phone]  DR. CHA CELL: [phone]  DR. JOE CELL: [phone]    FROM 5 PM - 7 AM PLEASE CALL ANSWERING SERVICE: [phone]    RENAL FOLLOW UP NOTE  --------------------------------------------------------------------------------  HPI:      Pt seen and examined at bedside.   Denies SOB, chest pain     PAST HISTORY  --------------------------------------------------------------------------------  No significant changes to PMH, PSH, FHx, SHx, unless otherwise noted    ALLERGIES & MEDICATIONS  --------------------------------------------------------------------------------  Allergies    No Known Allergies    Intolerances      Standing Inpatient Medications  acetaminophen   Tablet .. 975 milliGRAM(s) Oral <User Schedule>  heparin   Injectable 5000 Unit(s) SubCutaneous every 8 hours    PRN Inpatient Medications  calcium carbonate    500 mG (Tums) Chewable 1 Tablet(s) Chew every 6 hours PRN  ondansetron Injectable 4 milliGRAM(s) IV Push every 20 minutes PRN  oxyCODONE    IR 5 milliGRAM(s) Oral once PRN  simethicone 80 milliGRAM(s) Chew every 4 hours PRN  traMADol 50 milliGRAM(s) Oral every 6 hours PRN      REVIEW OF SYSTEMS  --------------------------------------------------------------------------------  General: no fever  CVS: no chest pain  RESP: no sob, no cough  ABD: no abdominal pain  : no dysuria,  MSK: no edema     VITALS/PHYSICAL EXAM  --------------------------------------------------------------------------------  T(C): 36.7 (07-11-20 @ 09:23), Max: 37.2 (07-10-20 @ 14:17)  HR: 99 (07-11-20 @ 09:23) (97 - 105)  BP: 139/85 (07-11-20 @ 09:23) (130/71 - 147/85)  RR: 18 (07-11-20 @ 09:23) (17 - 19)  SpO2: 99% (07-11-20 @ 09:23) (96% - 100%)  Wt(kg): --        07-10-20 @ 07:01  -  07-11-20 @ 07:00  --------------------------------------------------------  IN: 1240 mL / OUT: 5285 mL / NET: -4045 mL      Physical Exam:  	Gen: NAD  	HEENT: MMM  	Pulm: CTA B/L  	CV: S1S2  	Abd: Soft, +BS  	Ext: No LE edema B/L                      Neuro: Awake   	Skin: Warm and Dry   	Vascular access: no cathter            no cruz  LABS/STUDIES  --------------------------------------------------------------------------------              8.3    11.55 >-----------<  218      [07-11-20 @ 05:46]              25.8     142  |  105  |  15  ----------------------------<  109      [07-11-20 @ 05:46]  3.3   |  24  |  1.90        Ca     9.5     [07-11-20 @ 05:46]      Mg     1.4     [07-11-20 @ 05:46]      Phos  3.1     [07-11-20 @ 05:46]            Creatinine Trend:  SCr 1.90 [07-11 @ 05:46]  SCr 2.04 [07-10 @ 15:30]  SCr 2.01 [07-10 @ 05:30]  SCr 2.25 [07-09 @ 05:09]  SCr 2.17 [07-08 @ 06:30]    Urinalysis - [07-06-20 @ 21:41]      Color COLORLESS / Appearance CLEAR / SG 1.010 / pH 6.0      Gluc NEGATIVE / Ketone NEGATIVE  / Bili NEGATIVE / Urobili NORMAL       Blood NEGATIVE / Protein NEGATIVE / Leuk Est SMALL / Nitrite NEGATIVE      RBC 0-2 / WBC 6-10 / Hyaline NEGATIVE / Gran  / Sq Epi OCC / Non Sq Epi  / Bacteria NEGATIVE    Urine Creatinine 45.70      [07-07-20 @ 03:10]  Urine Sodium 61      [07-07-20 @ 03:10]  Urine Potassium 14.4      [07-07-20 @ 03:10]  Urine Chloride 45      [07-07-20 @ 03:10]

## 2020-07-12 LAB
ANION GAP SERPL CALC-SCNC: 11 MMO/L — SIGNIFICANT CHANGE UP (ref 7–14)
BASOPHILS # BLD AUTO: 0.05 K/UL — SIGNIFICANT CHANGE UP (ref 0–0.2)
BASOPHILS NFR BLD AUTO: 0.6 % — SIGNIFICANT CHANGE UP (ref 0–2)
BUN SERPL-MCNC: 14 MG/DL — SIGNIFICANT CHANGE UP (ref 7–23)
CALCIUM SERPL-MCNC: 9 MG/DL — SIGNIFICANT CHANGE UP (ref 8.4–10.5)
CHLORIDE SERPL-SCNC: 101 MMOL/L — SIGNIFICANT CHANGE UP (ref 98–107)
CO2 SERPL-SCNC: 25 MMOL/L — SIGNIFICANT CHANGE UP (ref 22–31)
CREAT SERPL-MCNC: 1.61 MG/DL — HIGH (ref 0.5–1.3)
EOSINOPHIL # BLD AUTO: 0.36 K/UL — SIGNIFICANT CHANGE UP (ref 0–0.5)
EOSINOPHIL NFR BLD AUTO: 4 % — SIGNIFICANT CHANGE UP (ref 0–6)
GLUCOSE SERPL-MCNC: 117 MG/DL — HIGH (ref 70–99)
HCT VFR BLD CALC: 28.6 % — LOW (ref 34.5–45)
HGB BLD-MCNC: 9.3 G/DL — LOW (ref 11.5–15.5)
IMM GRANULOCYTES NFR BLD AUTO: 0.7 % — SIGNIFICANT CHANGE UP (ref 0–1.5)
LYMPHOCYTES # BLD AUTO: 1.91 K/UL — SIGNIFICANT CHANGE UP (ref 1–3.3)
LYMPHOCYTES # BLD AUTO: 21.4 % — SIGNIFICANT CHANGE UP (ref 13–44)
MAGNESIUM SERPL-MCNC: 1.6 MG/DL — SIGNIFICANT CHANGE UP (ref 1.6–2.6)
MCHC RBC-ENTMCNC: 26.3 PG — LOW (ref 27–34)
MCHC RBC-ENTMCNC: 32.5 % — SIGNIFICANT CHANGE UP (ref 32–36)
MCV RBC AUTO: 80.8 FL — SIGNIFICANT CHANGE UP (ref 80–100)
MONOCYTES # BLD AUTO: 0.56 K/UL — SIGNIFICANT CHANGE UP (ref 0–0.9)
MONOCYTES NFR BLD AUTO: 6.3 % — SIGNIFICANT CHANGE UP (ref 2–14)
NEUTROPHILS # BLD AUTO: 5.98 K/UL — SIGNIFICANT CHANGE UP (ref 1.8–7.4)
NEUTROPHILS NFR BLD AUTO: 67 % — SIGNIFICANT CHANGE UP (ref 43–77)
NRBC # FLD: 0 K/UL — SIGNIFICANT CHANGE UP (ref 0–0)
PHOSPHATE SERPL-MCNC: 1.8 MG/DL — LOW (ref 2.5–4.5)
PLATELET # BLD AUTO: 228 K/UL — SIGNIFICANT CHANGE UP (ref 150–400)
PMV BLD: 10.8 FL — SIGNIFICANT CHANGE UP (ref 7–13)
POTASSIUM SERPL-MCNC: 3.5 MMOL/L — SIGNIFICANT CHANGE UP (ref 3.5–5.3)
POTASSIUM SERPL-SCNC: 3.5 MMOL/L — SIGNIFICANT CHANGE UP (ref 3.5–5.3)
RBC # BLD: 3.54 M/UL — LOW (ref 3.8–5.2)
RBC # FLD: 14.8 % — HIGH (ref 10.3–14.5)
SODIUM SERPL-SCNC: 137 MMOL/L — SIGNIFICANT CHANGE UP (ref 135–145)
WBC # BLD: 8.92 K/UL — SIGNIFICANT CHANGE UP (ref 3.8–10.5)
WBC # FLD AUTO: 8.92 K/UL — SIGNIFICANT CHANGE UP (ref 3.8–10.5)

## 2020-07-12 RX ORDER — MAGNESIUM SULFATE 500 MG/ML
2 VIAL (ML) INJECTION ONCE
Refills: 0 | Status: COMPLETED | OUTPATIENT
Start: 2020-07-12 | End: 2020-07-12

## 2020-07-12 RX ORDER — POTASSIUM CHLORIDE 20 MEQ
40 PACKET (EA) ORAL EVERY 4 HOURS
Refills: 0 | Status: COMPLETED | OUTPATIENT
Start: 2020-07-12 | End: 2020-07-12

## 2020-07-12 RX ORDER — SENNA PLUS 8.6 MG/1
2 TABLET ORAL AT BEDTIME
Refills: 0 | Status: DISCONTINUED | OUTPATIENT
Start: 2020-07-12 | End: 2020-07-13

## 2020-07-12 RX ORDER — POTASSIUM PHOSPHATE, MONOBASIC POTASSIUM PHOSPHATE, DIBASIC 236; 224 MG/ML; MG/ML
15 INJECTION, SOLUTION INTRAVENOUS ONCE
Refills: 0 | Status: COMPLETED | OUTPATIENT
Start: 2020-07-12 | End: 2020-07-12

## 2020-07-12 RX ADMIN — Medication 40 MILLIEQUIVALENT(S): at 13:52

## 2020-07-12 RX ADMIN — SENNA PLUS 2 TABLET(S): 8.6 TABLET ORAL at 22:02

## 2020-07-12 RX ADMIN — Medication 50 GRAM(S): at 13:52

## 2020-07-12 RX ADMIN — POTASSIUM PHOSPHATE, MONOBASIC POTASSIUM PHOSPHATE, DIBASIC 62.5 MILLIMOLE(S): 236; 224 INJECTION, SOLUTION INTRAVENOUS at 11:14

## 2020-07-12 RX ADMIN — Medication 40 MILLIEQUIVALENT(S): at 17:14

## 2020-07-12 NOTE — PROGRESS NOTE ADULT - PROBLEM SELECTOR PLAN 1
EDIE likely obstructive with severe b/l hydronephrosis likely 2/2 large fibroid uterus compressing ureters  -pt asymptomatic, gyn consulted, she is s/p Total Abdominal Hysterectomy, Bilateral Salpingectomy 7/9/20  - s/p 2 unit +1 unit PRBC. post transfusion CBC  - Cr stable. will monitor post op Cr, expected to improve. Cr 1.9 --> 1.6  - GYN and renal eval appreciated.  - supplement electrolytes  - if Cr continues to improve, will consider d/c cruz with TOV EDIE likely obstructive with severe b/l hydronephrosis likely 2/2 large fibroid uterus compressing ureters  -pt asymptomatic, gyn consulted, she is s/p Total Abdominal Hysterectomy, Bilateral Salpingectomy 7/9/20  - s/p 2 unit +1 unit PRBC. post transfusion CBC  - Cr stable. will monitor post op Cr, expected to improve. Cr 1.9 --> 1.6  - GYN and renal eval appreciated.  - supplement electrolytes  - if Cr continues to improve, will consider d/c cruz with TOV. (may need to repeat US renal to ensure resolution/improvement of b/l hydronephrosis)

## 2020-07-12 NOTE — PROGRESS NOTE ADULT - SUBJECTIVE AND OBJECTIVE BOX
POD#3  Pt was feeling better after blood tx last night but feels a little dizzy and lethargic since 4:45 am today; +Flatus; no BM yet    Physical exam:    Vital Signs Last 24 Hrs  T(C): 36.8 (12 Jul 2020 01:27), Max: 37.2 (11 Jul 2020 06:52)  T(F): 98.3 (12 Jul 2020 01:27), Max: 99 (11 Jul 2020 06:52)  HR: 90 (12 Jul 2020 01:27) (74 - 102)  BP: 114/73 (12 Jul 2020 01:27) (114/73 - 144/84)    Gen: NAD  Breast: Soft, nontender, not engorged.  Abdomen: Soft, nontender, no distension  Incision: Clean, dry, and intact with steri strips; No edema; No erythema; No purulence  Ext: No C/C/E, no calf pain    LABS:                        9.4    11.82 )-----------( 219      ( 11 Jul 2020 19:00 )             28.1                         8.3    11.55 )-----------( 218      ( 11 Jul 2020 05:46 )             25.8                         8.4    10.51 )-----------( 186      ( 10 Jul 2020 15:30 )             25.9       A/P Pt S/P COREY/BS POD#3.  Pt still feeling weak.  Pt with appropriate rise in HCT after blood tx.    Will  1) Ambulation encouraged  2) Pt still with external urinary catheter - D/C'd today  3) Check Comp and Magnesium today  4) Discharge Planning    Will follow    ZA Corado

## 2020-07-12 NOTE — CHART NOTE - NSCHARTNOTEFT_GEN_A_CORE
Good urine output, continued downtrending Cr.  Urology will sign off at this time, please call with further questions.

## 2020-07-12 NOTE — PROGRESS NOTE ADULT - ASSESSMENT
44 year old woman with no significant pmhx who presents to the ED at request of her nephrologist for EDIE/hydronephrosis likely related to fibroid uterus.    EDIE  Scr 1.08 1 year ago  EDIE likely sec to obstruction  CT with b/l hydro  f/u  and GYN  s/p surgery 7/9  renal function improving   monitor bmp  avoid nephrotoxic agents    Elevated BP without diagnosis of HTN   bp now controlled  not on meds  continue to monitor    Acidosis  non AG  improved   monitor     Hypomagnesemia  supplement mg sul today  keep mg>2  monitor    Hypokalemia  improved  keep k>4  monitor     Hypophosphatemia  replete K phos today  MOnitor serum po4

## 2020-07-12 NOTE — PROGRESS NOTE ADULT - SUBJECTIVE AND OBJECTIVE BOX
[facility] [place] [facility]   MD RM TOVAR MD RUORU WONG, PA    TEL:  OFFICE: [phone]  DR ENCARNACION CELL: [phone]  [facility] CELL: [phone]  DR. CHA CELL: [phone]  DR. JOE CELL: [phone]    FROM 5 PM - 7 AM PLEASE CALL ANSWERING SERVICE: [phone]    RENAL FOLLOW UP NOTE  --------------------------------------------------------------------------------  HPI:      Pt seen and examined at bedside.   sitting up in chair having lunch    PAST HISTORY  --------------------------------------------------------------------------------  No significant changes to PMH, PSH, FHx, SHx, unless otherwise noted    ALLERGIES & MEDICATIONS  --------------------------------------------------------------------------------  Allergies    No Known Allergies    Intolerances      Standing Inpatient Medications  acetaminophen   Tablet .. 975 milliGRAM(s) Oral <User Schedule>  heparin   Injectable 5000 Unit(s) SubCutaneous every 8 hours    PRN Inpatient Medications  calcium carbonate    500 mG (Tums) Chewable 1 Tablet(s) Chew every 6 hours PRN  ondansetron Injectable 4 milliGRAM(s) IV Push every 20 minutes PRN  oxyCODONE    IR 5 milliGRAM(s) Oral once PRN  simethicone 80 milliGRAM(s) Chew every 4 hours PRN  traMADol 50 milliGRAM(s) Oral every 6 hours PRN      REVIEW OF SYSTEMS  --------------------------------------------------------------------------------  General: no fever  CVS: no chest pain  RESP: no sob, no cough  ABD: no abdominal pain  : no dysuria,  MSK: no edema     VITALS/PHYSICAL EXAM  --------------------------------------------------------------------------------  T(C): 37.1 (07-12-20 @ 09:28), Max: 37.1 (07-12-20 @ 09:28)  HR: 86 (07-12-20 @ 09:28) (74 - 97)  BP: 116/79 (07-12-20 @ 09:28) (114/73 - 144/84)  RR: 18 (07-12-20 @ 09:28) (17 - 18)  SpO2: 99% (07-12-20 @ 09:28) (96% - 100%)  Wt(kg): --        07-11-20 @ 07:01  -  07-12-20 @ 07:00  --------------------------------------------------------  IN: 1000 mL / OUT: 2600 mL / NET: -1600 mL    07-12-20 @ 07:01  -  07-12-20 @ 11:56  --------------------------------------------------------  IN: 300 mL / OUT: 900 mL / NET: -600 mL      Physical Exam:  	Gen: NAD  	HEENT: MMM  	Pulm: CTA B/L  	CV: S1S2  	Abd: Soft, +BS  	Ext: No LE edema B/L                      Neuro: Awake alert  	Skin: Warm and Dry   	Vascular access: no hd catheter            no  cruz  LABS/STUDIES  --------------------------------------------------------------------------------              9.3    8.92  >-----------<  228      [07-12-20 @ 07:35]              28.6     137  |  101  |  14  ----------------------------<  117      [07-12-20 @ 07:35]  3.5   |  25  |  1.61        Ca     9.0     [07-12-20 @ 07:35]      Mg     1.6     [07-12-20 @ 07:35]      Phos  1.8     [07-12-20 @ 07:35]    TPro  6.7  /  Alb  3.5  /  TBili  < 0.2  /  DBili  x   /  AST  16  /  ALT  5   /  AlkPhos  48  [07-11-20 @ 19:00]          Creatinine Trend:  SCr 1.61 [07-12 @ 07:35]  SCr 1.85 [07-11 @ 19:00]  SCr 1.90 [07-11 @ 05:46]  SCr 2.04 [07-10 @ 15:30]  SCr 2.01 [07-10 @ 05:30]    Urinalysis - [07-06-20 @ 21:41]      Color COLORLESS / Appearance CLEAR / SG 1.010 / pH 6.0      Gluc NEGATIVE / Ketone NEGATIVE  / Bili NEGATIVE / Urobili NORMAL       Blood NEGATIVE / Protein NEGATIVE / Leuk Est SMALL / Nitrite NEGATIVE      RBC 0-2 / WBC 6-10 / Hyaline NEGATIVE / Gran  / Sq Epi OCC / Non Sq Epi  / Bacteria NEGATIVE    Urine Creatinine 45.70      [07-07-20 @ 03:10]  Urine Sodium 61      [07-07-20 @ 03:10]  Urine Potassium 14.4      [07-07-20 @ 03:10]  Urine Chloride 45      [07-07-20 @ 03:10]

## 2020-07-12 NOTE — PROGRESS NOTE ADULT - SUBJECTIVE AND OBJECTIVE BOX
Patient is a 44y old  Female who presents with a chief complaint of hydronephrosis (12 Jul 2020 12:45)      SUBJECTIVE / OVERNIGHT EVENTS:  feeling better  hgb stable  pain control  Cr 1.6   no cp, no sob, no n/v/d. no abdominal pain.  no headache, no dizziness.       Vital Signs Last 24 Hrs  T(C): 37.1 (12 Jul 2020 18:11), Max: 37.1 (12 Jul 2020 09:28)  T(F): 98.7 (12 Jul 2020 18:11), Max: 98.7 (12 Jul 2020 09:28)  HR: 93 (12 Jul 2020 18:11) (86 - 96)  BP: 138/79 (12 Jul 2020 18:11) (114/73 - 139/76)  BP(mean): --  RR: 20 (12 Jul 2020 18:11) (17 - 20)  SpO2: 99% (12 Jul 2020 18:11) (96% - 100%)  I&O's Summary    11 Jul 2020 07:01  -  12 Jul 2020 07:00  --------------------------------------------------------  IN: 1000 mL / OUT: 2600 mL / NET: -1600 mL    12 Jul 2020 07:01  -  12 Jul 2020 20:04  --------------------------------------------------------  IN: 900 mL / OUT: 1800 mL / NET: -900 mL        PHYSICAL EXAM:  GENERAL: NAD, Comfortable  HEAD:  Atraumatic, Normocephalic  EYES: EOMI, PERRLA, conjunctiva and sclera clear  NECK: Supple, No JVD  CHEST/LUNG: Clear to auscultation bilaterally; No wheeze  HEART: Regular rate and rhythm; No murmurs, rubs, or gallops  ABDOMEN: Soft, appropriate post surgical tenderenss, Nondistended; Bowel sounds present, dressing d/c/i, abd binder in place  Neuro: AAO x 3, no focal deficit, 5/5 b/l extremities  EXTREMITIES:  2+ Peripheral Pulses, No clubbing, cyanosis, or edema  SKIN: No rashes or lesions    LABS:                        9.3    8.92  )-----------( 228      ( 12 Jul 2020 07:35 )             28.6     07-12    137  |  101  |  14  ----------------------------<  117<H>  3.5   |  25  |  1.61<H>    Ca    9.0      12 Jul 2020 07:35  Phos  1.8     07-12  Mg     1.6     07-12    TPro  6.7  /  Alb  3.5  /  TBili  < 0.2<L>  /  DBili  x   /  AST  16  /  ALT  5   /  AlkPhos  48  07-11      CAPILLARY BLOOD GLUCOSE                RADIOLOGY & ADDITIONAL TESTS:    Imaging Personally Reviewed:  [x] YES  [ ] NO    Consultant(s) Notes Reviewed:  [x] YES  [ ] NO      MEDICATIONS  (STANDING):  acetaminophen   Tablet .. 975 milliGRAM(s) Oral <User Schedule>  heparin   Injectable 5000 Unit(s) SubCutaneous every 8 hours    MEDICATIONS  (PRN):  calcium carbonate    500 mG (Tums) Chewable 1 Tablet(s) Chew every 6 hours PRN Dyspepsia  ondansetron Injectable 4 milliGRAM(s) IV Push every 20 minutes PRN Nausea and/or Vomiting  oxyCODONE    IR 5 milliGRAM(s) Oral once PRN Moderate to Severe Pain (4-10)  simethicone 80 milliGRAM(s) Chew every 4 hours PRN Gas  traMADol 50 milliGRAM(s) Oral every 6 hours PRN Moderate Pain (4 - 6) to Severe Pain (7-10)      Care Discussed with Consultants/Other Providers [x] YES  [ ] NO    HEALTH ISSUES - PROBLEM Dx:  Need for prophylactic measure: Need for prophylactic measure  Fibroid: Fibroid  Acute kidney injury: Acute kidney injury

## 2020-07-12 NOTE — PROGRESS NOTE ADULT - SUBJECTIVE AND OBJECTIVE BOX
R4 GYN Progress Note    INTERVAL HPI/OVERNIGHT EVENTS: Pt seen and examined at bedside.  Pt w no complaints, states she feels better after the 1u pRBC yesterday, although had an episode of lightheadedness this morning.  Ambulating without difficulty, voiding without issue, passing gas, tolerating PO, denies nausea, vomiting, SOB, CP, fevers, chills, pain well controlled.      MEDICATIONS  (STANDING):  acetaminophen   Tablet .. 975 milliGRAM(s) Oral <User Schedule>  heparin   Injectable 5000 Unit(s) SubCutaneous every 8 hours    MEDICATIONS  (PRN):  calcium carbonate    500 mG (Tums) Chewable 1 Tablet(s) Chew every 6 hours PRN Dyspepsia  ondansetron Injectable 4 milliGRAM(s) IV Push every 20 minutes PRN Nausea and/or Vomiting  oxyCODONE    IR 5 milliGRAM(s) Oral once PRN Moderate to Severe Pain (4-10)  simethicone 80 milliGRAM(s) Chew every 4 hours PRN Gas  traMADol 50 milliGRAM(s) Oral every 6 hours PRN Moderate Pain (4 - 6) to Severe Pain (7-10)      Allergies    No Known Allergies    12 point ROS negative except as outlined above    Vital Signs Last 24 Hrs  T(C): 37.1 (12 Jul 2020 09:28), Max: 37.1 (12 Jul 2020 09:28)  T(F): 98.7 (12 Jul 2020 09:28), Max: 98.7 (12 Jul 2020 09:28)  HR: 86 (12 Jul 2020 09:28) (74 - 97)  BP: 116/79 (12 Jul 2020 09:28) (114/73 - 144/84)  RR: 18 (12 Jul 2020 09:28) (17 - 18)  SpO2: 99% (12 Jul 2020 09:28) (96% - 100%)      PHYSICAL EXAM:    GA: NAD, A+0 x 3  CV: RRR  Pulm: CTA BL  Abd: ( + ) BS, soft, nontender, nondistended, no rebound or guarding  Incision: midline vertical incision clean, dry and intact; steri-strips in place  : no vaginal bleeding noted  Extremities: no swelling or calf tenderness      LABS:                        9.3    8.92  )-----------( 228      ( 12 Jul 2020 07:35 )             28.6     07-12    137  |  101  |  14  ----------------------------<  117<H>  3.5   |  25  |  1.61<H>    Ca    9.0      12 Jul 2020 07:35  Phos  1.8     07-12  Mg     1.6     07-12    TPro  6.7  /  Alb  3.5  /  TBili  < 0.2<L>  /  DBili  x   /  AST  16  /  ALT  5   /  AlkPhos  48  07-11      RADIOLOGY & ADDITIONAL TESTS:

## 2020-07-12 NOTE — PROGRESS NOTE ADULT - ASSESSMENT
A/P: 44y s/p Total Abdominal Hysterectomy, Bilateral Salpingectomy 2/2 symptomatic fibroids causing hydronephrosis and EDIE, POD#3  Neuro: c/w po pain meds prn  CV: Hemodynamically stable, continue to monitor, AM labs stable  Pulm: Saturating well on room air, encourage oob/amb  GI: c/w regular diet  : voiding spontaneously, continue to monitor; Cr downtrending, continue to follow; repleting electrolytes prn, phos and mag repletion today  Heme: c/w HSQ and SCDs for DVT ppx  Dispo: Continue routine post-op care, repeat AM labs    D/w Dr. Mak Orosco PGY4

## 2020-07-13 ENCOUNTER — TRANSCRIPTION ENCOUNTER (OUTPATIENT)
Age: 45
End: 2020-07-13

## 2020-07-13 VITALS
TEMPERATURE: 98 F | RESPIRATION RATE: 18 BRPM | DIASTOLIC BLOOD PRESSURE: 90 MMHG | OXYGEN SATURATION: 99 % | SYSTOLIC BLOOD PRESSURE: 139 MMHG | HEART RATE: 96 BPM

## 2020-07-13 LAB
ANION GAP SERPL CALC-SCNC: 11 MMO/L — SIGNIFICANT CHANGE UP (ref 7–14)
BASOPHILS # BLD AUTO: 0.06 K/UL — SIGNIFICANT CHANGE UP (ref 0–0.2)
BASOPHILS NFR BLD AUTO: 0.7 % — SIGNIFICANT CHANGE UP (ref 0–2)
BUN SERPL-MCNC: 14 MG/DL — SIGNIFICANT CHANGE UP (ref 7–23)
CALCIUM SERPL-MCNC: 8.8 MG/DL — SIGNIFICANT CHANGE UP (ref 8.4–10.5)
CHLORIDE SERPL-SCNC: 103 MMOL/L — SIGNIFICANT CHANGE UP (ref 98–107)
CO2 SERPL-SCNC: 22 MMOL/L — SIGNIFICANT CHANGE UP (ref 22–31)
CREAT SERPL-MCNC: 1.49 MG/DL — HIGH (ref 0.5–1.3)
EOSINOPHIL # BLD AUTO: 0.43 K/UL — SIGNIFICANT CHANGE UP (ref 0–0.5)
EOSINOPHIL NFR BLD AUTO: 5.1 % — SIGNIFICANT CHANGE UP (ref 0–6)
GLUCOSE SERPL-MCNC: 94 MG/DL — SIGNIFICANT CHANGE UP (ref 70–99)
HCT VFR BLD CALC: 29.6 % — LOW (ref 34.5–45)
HGB BLD-MCNC: 9.7 G/DL — LOW (ref 11.5–15.5)
IMM GRANULOCYTES NFR BLD AUTO: 0.5 % — SIGNIFICANT CHANGE UP (ref 0–1.5)
LYMPHOCYTES # BLD AUTO: 2.17 K/UL — SIGNIFICANT CHANGE UP (ref 1–3.3)
LYMPHOCYTES # BLD AUTO: 26 % — SIGNIFICANT CHANGE UP (ref 13–44)
MAGNESIUM SERPL-MCNC: 1.7 MG/DL — SIGNIFICANT CHANGE UP (ref 1.6–2.6)
MCHC RBC-ENTMCNC: 26.6 PG — LOW (ref 27–34)
MCHC RBC-ENTMCNC: 32.8 % — SIGNIFICANT CHANGE UP (ref 32–36)
MCV RBC AUTO: 81.1 FL — SIGNIFICANT CHANGE UP (ref 80–100)
MONOCYTES # BLD AUTO: 0.69 K/UL — SIGNIFICANT CHANGE UP (ref 0–0.9)
MONOCYTES NFR BLD AUTO: 8.3 % — SIGNIFICANT CHANGE UP (ref 2–14)
NEUTROPHILS # BLD AUTO: 4.97 K/UL — SIGNIFICANT CHANGE UP (ref 1.8–7.4)
NEUTROPHILS NFR BLD AUTO: 59.4 % — SIGNIFICANT CHANGE UP (ref 43–77)
NRBC # FLD: 0 K/UL — SIGNIFICANT CHANGE UP (ref 0–0)
PHOSPHATE SERPL-MCNC: 1.9 MG/DL — LOW (ref 2.5–4.5)
PLATELET # BLD AUTO: 255 K/UL — SIGNIFICANT CHANGE UP (ref 150–400)
PMV BLD: 10.4 FL — SIGNIFICANT CHANGE UP (ref 7–13)
POTASSIUM SERPL-MCNC: 3.8 MMOL/L — SIGNIFICANT CHANGE UP (ref 3.5–5.3)
POTASSIUM SERPL-SCNC: 3.8 MMOL/L — SIGNIFICANT CHANGE UP (ref 3.5–5.3)
RBC # BLD: 3.65 M/UL — LOW (ref 3.8–5.2)
RBC # FLD: 14.6 % — HIGH (ref 10.3–14.5)
SODIUM SERPL-SCNC: 136 MMOL/L — SIGNIFICANT CHANGE UP (ref 135–145)
SURGICAL PATHOLOGY STUDY: SIGNIFICANT CHANGE UP
WBC # BLD: 8.36 K/UL — SIGNIFICANT CHANGE UP (ref 3.8–10.5)
WBC # FLD AUTO: 8.36 K/UL — SIGNIFICANT CHANGE UP (ref 3.8–10.5)

## 2020-07-13 RX ORDER — TRAMADOL HYDROCHLORIDE 50 MG/1
1 TABLET ORAL
Qty: 8 | Refills: 0
Start: 2020-07-13 | End: 2020-07-14

## 2020-07-13 RX ORDER — NYSTATIN CREAM 100000 [USP'U]/G
1 CREAM TOPICAL
Qty: 14 | Refills: 0
Start: 2020-07-13 | End: 2020-07-19

## 2020-07-13 RX ORDER — ACETAMINOPHEN 500 MG
3 TABLET ORAL
Qty: 0 | Refills: 0 | DISCHARGE
Start: 2020-07-13

## 2020-07-13 NOTE — PROGRESS NOTE ADULT - PROBLEM SELECTOR PLAN 2
as above, seen on CT compressing b/l ureters causing hydronephrosis  -GYN eval appreciated. s/p OR, see above.
as above, seen on CT compressing b/l ureters causing hydronephrosis  -gyn consulted, recs appreciated
as above, seen on CT compressing b/l ureters causing hydronephrosis  -gyn consulted, recs appreciated
2/2 EDIE; will replete to 4

## 2020-07-13 NOTE — DISCHARGE NOTE PROVIDER - NSDCCPCAREPLAN_GEN_ALL_CORE_FT
PRINCIPAL DISCHARGE DIAGNOSIS  Diagnosis: Acute kidney injury  Assessment and Plan of Treatment:       SECONDARY DISCHARGE DIAGNOSES  Diagnosis: Fibroid  Assessment and Plan of Treatment:     Diagnosis: Urinary retention  Assessment and Plan of Treatment:

## 2020-07-13 NOTE — DISCHARGE NOTE PROVIDER - CARE PROVIDER_API CALL
[facility],   Oncology Building, Basement Level  [address] [place] [address]  Phone: [phone]  Fax: (   )    -  Follow Up Time:

## 2020-07-13 NOTE — DISCHARGE NOTE PROVIDER - HOSPITAL COURSE
43yo F  who was sent in to ED by her nephrologist Dr. Jones for admission for new EDIE secondary to fibroid uterus compressing ureters bilaterally causing hydronephrosis.  Pt initially presented to her PCP at the end of  with urinary frequency/nocturia and bilateral lower extremity swelling.  Her PCP prescribed keflex for presumed UTI and lasix and referred to nephrology, Dr. Jones.  Further nephrology workup showed EDIE (Cr increased from baseline 1.1 to 1.8) and bilateral hydronephrosis seen on outpatient renal ultrasound.  In ED, CTAP shows enlarged multifibroid uterus extending to just below the sternum.  The cause of her hydronephrosis was presumed to be due to ureteral obstruction from her enlarged uterus. She was scheduled for a total abdominal hysterectomy and bilateral salpingectomy to resolve her hydronephrosis and improve her EDIE.        HD1: Admitted to Gyn team, with Urology, nephrology and internal medicine consulted to provide recommendations on her EDIE and ureteral obstruction.         HD2: Patient scheduled for surgery, which was delayed due to OR availability.         HD3: Patient underwent total abdominal hysterectomy and bilateral salpingectomy that was uncomplicated.         HD4/ POD1: Post-op H/H was 8.1/24.6. Patient's creatinine began to improve after decompression. Urine output was adequate and patient showed no signs of post-obstructive diuresis.         HD5/POD2: Patient had symptomatic anemia with an H/H of 8.3/25.8, which was up-trending after surgery. Received 1U pRBCs. continued to show improving renal function, with downtrending creatinine.         HD6/POD3: Patient remained stable.        HD7/POD4: Patient discharged home. Passing gas, tolerating a regular diet and ambulating well.

## 2020-07-13 NOTE — PROGRESS NOTE ADULT - PROVIDER SPECIALTY LIST ADULT
Anesthesia
GYN
Internal Medicine
Nephrology
Urology
Urology
Nephrology
Nephrology
Internal Medicine

## 2020-07-13 NOTE — PROGRESS NOTE ADULT - SUBJECTIVE AND OBJECTIVE BOX
Patient is a 44y old  Female who presents with a chief complaint of hydronephrosis (13 Jul 2020 11:11)      SUBJECTIVE / OVERNIGHT EVENTS:  feels well  Cr continue to improve  no cp, no sob, no n/v/d. no abdominal pain.  no headache, no dizziness.   breathing better      Vital Signs Last 24 Hrs  T(C): 36.8 (13 Jul 2020 10:34), Max: 37.1 (12 Jul 2020 18:11)  T(F): 98.2 (13 Jul 2020 10:34), Max: 98.7 (12 Jul 2020 18:11)  HR: 96 (13 Jul 2020 10:34) (86 - 96)  BP: 139/90 (13 Jul 2020 10:34) (112/70 - 139/90)  BP(mean): --  RR: 18 (13 Jul 2020 10:34) (18 - 20)  SpO2: 99% (13 Jul 2020 10:34) (99% - 100%)  I&O's Summary    12 Jul 2020 07:01  -  13 Jul 2020 07:00  --------------------------------------------------------  IN: 900 mL / OUT: 2800 mL / NET: -1900 mL        PHYSICAL EXAM:  GENERAL: NAD, Comfortable  HEAD:  Atraumatic, Normocephalic  EYES: EOMI, PERRLA, conjunctiva and sclera clear  NECK: Supple, No JVD  CHEST/LUNG: Clear to auscultation bilaterally; No wheeze  HEART: Regular rate and rhythm; No murmurs, rubs, or gallops  ABDOMEN: Soft, appropriate post surgical tenderenss, Nondistended; Bowel sounds present, dressing d/c/i, abd binder in place  Neuro: AAO x 3, no focal deficit, 5/5 b/l extremities  EXTREMITIES:  2+ Peripheral Pulses, No clubbing, cyanosis, or edema  SKIN: No rashes or lesions    LABS:                        9.7    8.36  )-----------( 255      ( 13 Jul 2020 05:50 )             29.6     07-13    136  |  103  |  14  ----------------------------<  94  3.8   |  22  |  1.49<H>    Ca    8.8      13 Jul 2020 05:50  Phos  1.9     07-13  Mg     1.7     07-13    TPro  6.7  /  Alb  3.5  /  TBili  < 0.2<L>  /  DBili  x   /  AST  16  /  ALT  5   /  AlkPhos  48  07-11      CAPILLARY BLOOD GLUCOSE                RADIOLOGY & ADDITIONAL TESTS:    Imaging Personally Reviewed:  [x] YES  [ ] NO    Consultant(s) Notes Reviewed:  [x] YES  [ ] NO      MEDICATIONS  (STANDING):  acetaminophen   Tablet .. 975 milliGRAM(s) Oral <User Schedule>  heparin   Injectable 5000 Unit(s) SubCutaneous every 8 hours  senna 2 Tablet(s) Oral at bedtime    MEDICATIONS  (PRN):  calcium carbonate    500 mG (Tums) Chewable 1 Tablet(s) Chew every 6 hours PRN Dyspepsia  ondansetron Injectable 4 milliGRAM(s) IV Push every 20 minutes PRN Nausea and/or Vomiting  oxyCODONE    IR 5 milliGRAM(s) Oral once PRN Moderate to Severe Pain (4-10)  simethicone 80 milliGRAM(s) Chew every 4 hours PRN Gas  traMADol 50 milliGRAM(s) Oral every 6 hours PRN Moderate Pain (4 - 6) to Severe Pain (7-10)      Care Discussed with Consultants/Other Providers [x] YES  [ ] NO    HEALTH ISSUES - PROBLEM Dx:  Need for prophylactic measure: Need for prophylactic measure  Fibroid: Fibroid  Acute kidney injury: Acute kidney injury

## 2020-07-13 NOTE — PROGRESS NOTE ADULT - PROBLEM SELECTOR PLAN 1
EDIE likely obstructive with severe b/l hydronephrosis likely 2/2 large fibroid uterus compressing ureters  -pt asymptomatic, gyn consulted, she is s/p Total Abdominal Hysterectomy, Bilateral Salpingectomy 7/9/20  - s/p 2 unit +1 unit PRBC. post transfusion CBC  - Cr stable. will monitor post op Cr, expected to improve. Cr 1.9 --> 1.6 --> 1.49  - GYN and renal eval appreciated.  - supplement electrolytes  - already off cruz, passed TOV  (can repeat outpt US renal in 2-4 weeks to ensure resolution of b/l hydronephrosis)

## 2020-07-13 NOTE — PROGRESS NOTE ADULT - SUBJECTIVE AND OBJECTIVE BOX
GYN Progress Note    POD#4   HD#7    Patient seen and examined at bedside by GYN team.  No acute events overnight. No acute concerns.  Pain well controlled with PO pain medications.  Patient is ambulating and tolerating regular diet.  Patient is passing flatus, but has no BM and is having pain associated with this.   Patient is voiding spontaneously.   Denies CP, SOB, N/V, fevers, and chills.    Vital Signs Last 24 Hours  T(C): 36.7 (07-13-20 @ 05:30), Max: 37.1 (07-12-20 @ 09:28)  HR: 86 (07-13-20 @ 05:30) (86 - 95)  BP: 115/69 (07-13-20 @ 05:30) (112/70 - 138/89)  RR: 18 (07-13-20 @ 05:30) (18 - 20)  SpO2: 100% (07-13-20 @ 05:30) (99% - 100%)    I&O's Summary    11 Jul 2020 07:01  -  12 Jul 2020 07:00  --------------------------------------------------------  IN: 1000 mL / OUT: 2600 mL / NET: -1600 mL    12 Jul 2020 07:01  -  13 Jul 2020 06:25  --------------------------------------------------------  IN: 900 mL / OUT: 1800 mL / NET: -900 mL        Physical Exam:  General: NAD  CV: RRR  Lungs: CTAB  Abdomen: soft, tender RLQ, non-distended, normoactive bowel sounds  Incision: CDI  : no bleeding on pad  Ext: no pain or swelling     Labs:                        9.3    8.92  )-----------( 228      ( 12 Jul 2020 07:35 )             28.6   baso 0.6    eos 4.0    imm gran 0.7    lymph 21.4   mono 6.3    poly 67.0                         9.4    11.82 )-----------( 219      ( 11 Jul 2020 19:00 )             28.1     07-12    137  |  101  |  14  ----------------------------<  117<H>  3.5   |  25  |  1.61<H>    Ca    9.0      12 Jul 2020 07:35  Phos  1.8     07-12  Mg     1.6     07-12    TPro  6.7  /  Alb  3.5  /  TBili  < 0.2<L>  /  DBili  x   /  AST  16  /  ALT  5   /  AlkPhos  48  07-11        MEDICATIONS  (STANDING):  acetaminophen   Tablet .. 975 milliGRAM(s) Oral <User Schedule>  heparin   Injectable 5000 Unit(s) SubCutaneous every 8 hours  senna 2 Tablet(s) Oral at bedtime    MEDICATIONS  (PRN):  calcium carbonate    500 mG (Tums) Chewable 1 Tablet(s) Chew every 6 hours PRN Dyspepsia  ondansetron Injectable 4 milliGRAM(s) IV Push every 20 minutes PRN Nausea and/or Vomiting  oxyCODONE    IR 5 milliGRAM(s) Oral once PRN Moderate to Severe Pain (4-10)  simethicone 80 milliGRAM(s) Chew every 4 hours PRN Gas  traMADol 50 milliGRAM(s) Oral every 6 hours PRN Moderate Pain (4 - 6) to Severe Pain (7-10)

## 2020-07-13 NOTE — DISCHARGE NOTE NURSING/CASE MANAGEMENT/SOCIAL WORK - NSDCPNINST_GEN_ALL_CORE
Monitor for swelling, redness, drainage from surgical site, fever, shortness of breath and call your doctor if present. Monitor for swelling, redness, drainage from surgical site, fever, shortness of breath, nausea or vomiting and call your doctor if present.

## 2020-07-13 NOTE — PROGRESS NOTE ADULT - ASSESSMENT
44 year old woman with no significant pmhx who presents to the ED at request of her nephrologist for EDIE/hydronephrosis likely related to fibroid uterus.    EDIE  Scr 1.08 1 year ago  EDIE likely sec to obstruction  CT with b/l hydro  f/u  and GYN  s/p hysterectomy   renal function improving  pt stable from renal for discharge. pt to follow up with dr. sethi in 1-2 weeks   monitor bmp  avoid nephrotoxic agents    Elevated BP without diagnosis of HTN   not on meds at home  controlled  monitor    Acidosis  improved   monitor

## 2020-07-13 NOTE — PROGRESS NOTE ADULT - REASON FOR ADMISSION
hydronephrosis

## 2020-07-13 NOTE — PROGRESS NOTE ADULT - ASSESSMENT
A/P: 44y POD#4 s/p Total Abdominal Hysterectomy, BS that was uncomplicated.  Patient is stable and doing well.      1) Neuro:   #Post-op pain:  - Continue PO pain medication    2) CV/Resp:   #Acute blood loss from surgery: Hemodynamically stable.  - F/u AM CBCP    3) GI/FEN:   F: Saline locked.  E: replete electrolytes PRN  N: Continue regular diet    4) : no active issues. crt continues to improve with return of normal urinary function.  - UOP adequate  - Appreciate recs from medicine, nephrology and urology     5) ID: no active issues. afebrile.     6) Endo: no active issues.     7) Prophy: c/w SubQ heparin and SCDs. Encourage ambulation and IS.    9) Dispo: home today.     Carole Bush MD, MS   PGY-1    D/W Niya PGY-4

## 2020-07-13 NOTE — PROGRESS NOTE ADULT - PROBLEM SELECTOR PLAN 3
DVT ppx  diet per GYN team
SCDs for now given possible procedure, renal diet
SCDs for now given possible procedure, renal diet
2/2 EDIE; will replete to 3

## 2020-07-13 NOTE — PROGRESS NOTE ADULT - PROBLEM SELECTOR PROBLEM 3
Need for prophylactic measure
R/O Hypomagnesemia

## 2020-07-13 NOTE — DISCHARGE NOTE PROVIDER - PROVIDER TOKENS
FREE:[LAST:[[facility]],PHONE:[phone]],FAX:[(   )    -],ADDRESS:[[facility]  [address] [place] [address]]]

## 2020-07-13 NOTE — PROGRESS NOTE ADULT - SUBJECTIVE AND OBJECTIVE BOX
[facility]   MD AUDREY TOVAR DO ANAM SIDDIQUI ANGELA WONG, PA    TEL:  OFFICE: [phone]  DR ENCARNACION CELL: [phone]  DR. JOE CELL: [phone]  DR. CHA CELL: [phone]  MARY HANSEN CELL: [phone]    From 5pm-7am Answering Service [phone]    Patient is a 44y old  Female who presents with a chief complaint of hydronephrosis (13 Jul 2020 06:37)      Patient seen and examined at bedside. No chest pain/sob    VITALS:  T(F): 98.2 (07-13-20 @ 10:34), Max: 98.7 (07-12-20 @ 18:11)  HR: 96 (07-13-20 @ 10:34)  BP: 139/90 (07-13-20 @ 10:34)  RR: 18 (07-13-20 @ 10:34)  SpO2: 99% (07-13-20 @ 10:34)  Wt(kg): --    07-12 @ 07:01  -  07-13 @ 07:00  --------------------------------------------------------  IN: 900 mL / OUT: 2800 mL / NET: -1900 mL          PHYSICAL EXAM:  Constitutional: NAD  Neck: No JVD  Respiratory: CTAB, no wheezes, rales or rhonchi  Cardiovascular: S1, S2, RRR  Gastrointestinal: BS+, soft, NT/ND  Extremities: No peripheral edema    Hospital Medications:   MEDICATIONS  (STANDING):  acetaminophen   Tablet .. 975 milliGRAM(s) Oral <User Schedule>  heparin   Injectable 5000 Unit(s) SubCutaneous every 8 hours  senna 2 Tablet(s) Oral at bedtime      LABS:  07-13    136  |  103  |  14  ----------------------------<  94  3.8   |  22  |  1.49<H>    Ca    8.8      13 Jul 2020 05:50  Phos  1.9     07-13  Mg     1.7     07-13    TPro  6.7  /  Alb  3.5  /  TBili  < 0.2<L>  /  DBili      /  AST  16  /  ALT  5   /  AlkPhos  48  07-11    Creatinine Trend: 1.49 <--, 1.61 <--, 1.85 <--, 1.90 <--, 2.04 <--, 2.01 <--, 2.25 <--, 2.17 <--, 2.00 <--, 2.36 <--    Phosphorus Level, Serum: 1.9 mg/dL (07-13 @ 05:50)                              9.7    8.36  )-----------( 255      ( 13 Jul 2020 05:50 )             29.6     Urine Studies:  Urinalysis - [07-06-20 @ 21:41]      Color COLORLESS / Appearance CLEAR / SG 1.010 / pH 6.0      Gluc NEGATIVE / Ketone NEGATIVE  / Bili NEGATIVE / Urobili NORMAL       Blood NEGATIVE / Protein NEGATIVE / Leuk Est SMALL / Nitrite NEGATIVE      RBC 0-2 / WBC 6-10 / Hyaline NEGATIVE / Gran  / Sq Epi OCC / Non Sq Epi  / Bacteria NEGATIVE    Urine Creatinine 45.70      [07-07-20 @ 03:10]  Urine Sodium 61      [07-07-20 @ 03:10]  Urine Potassium 14.4      [07-07-20 @ 03:10]  Urine Chloride 45      [07-07-20 @ 03:10]          RADIOLOGY & ADDITIONAL STUDIES:

## 2020-07-13 NOTE — PROGRESS NOTE ADULT - ATTENDING COMMENTS
- Dr. GUS Wu ([facility])  - [phone]
- Dr. GUS Wu ([facility])  - [phone]
- Dr. GUS Wu ([facility])  - [phone]
- Dr. GUS Wu ([facility])  - [phone]
- Dr. GUS Wu ([facility])  - [phone]
no objection to d/c planning per the primary team.     - [facility]  - [phone]
POD#1  Pt with c/o of ab pain; no Flatus; no BM yet    Physical exam:    VSS, afebrile    Gen: NAD  Abdomen: Soft, NT/ND; BS+  Incision: Dressing Intact, Clean, dry,; No edema; No erythema; No purulence noted  Ext: No C/C/E, no calf pain    LABS:                            8.4    10.51 )-----------( 186      ( 10 Jul 2020 15:30 )             25.9                         8.1    12.01 )-----------( 198      ( 10 Jul 2020 05:30 )             24.6                   A/P Pt S/P COREY/BS POD#1.  Pt with stable CBC. Pt in stable condition.     Will  1) Continue with post-op care  2) Ambulation encouraged  3) Discharge Planning  4) Repeat CBC in am    Will follow    ZA WANG This is a late entry note for pt who was seen previously.

## 2020-07-13 NOTE — DISCHARGE NOTE PROVIDER - NSDCCPTREATMENT_GEN_ALL_CORE_FT
PRINCIPAL PROCEDURE  Procedure: Total abdominal hysterectomy  Findings and Treatment:       SECONDARY PROCEDURE  Procedure: Bilateral salpingectomy  Findings and Treatment:

## 2020-07-13 NOTE — DISCHARGE NOTE NURSING/CASE MANAGEMENT/SOCIAL WORK - PATIENT PORTAL LINK FT
You can access the FollowMyHealth Patient Portal offered by [facility] by registering at the following website: [email or web address]. By joining [facility]’s FollowMyHealth portal, you will also be able to view your health information using other applications (apps) compatible with our system.

## 2020-07-16 NOTE — CHART NOTE - NSCHARTNOTEFT_GEN_A_CORE
R1 Gyn Phone Note    Patient contacted using provided phone number to discuss pathology results from Total Abdominal Hysterectomy, BS on 7/9. The pathology showed adenomyosis and fibroids, as expected.     All questions answered to patient's apparent satisfaction    Planned follow up:   - 7/22 clinic apt    Carole Bush, PGY-1    to be D/W Ana Luisa PGY-4 R1 Gyn Phone Note    Patient contacted using provided phone number to discuss pathology results from Total Abdominal Hysterectomy, BS on 7/9. The pathology showed adenomyosis and fibroids, as expected.     All questions answered to patient's apparent satisfaction    Planned follow up:   - 7/22 clinic apt    Carole Bush, PGY-1    D/W Ana Luisa PGY-4

## 2020-07-22 ENCOUNTER — APPOINTMENT (OUTPATIENT)
Dept: OBGYN | Facility: HOSPITAL | Age: 45
End: 2020-07-22
Payer: MEDICAID

## 2020-07-22 ENCOUNTER — OUTPATIENT (OUTPATIENT)
Dept: OUTPATIENT SERVICES | Facility: HOSPITAL | Age: 45
LOS: 1 days | End: 2020-07-22

## 2020-07-22 VITALS
BODY MASS INDEX: 33.74 KG/M2 | WEIGHT: 215 LBS | TEMPERATURE: 98.9 F | HEART RATE: 95 BPM | SYSTOLIC BLOOD PRESSURE: 118 MMHG | HEIGHT: 67 IN | DIASTOLIC BLOOD PRESSURE: 63 MMHG

## 2020-07-22 DIAGNOSIS — Z98.890 OTHER SPECIFIED POSTPROCEDURAL STATES: ICD-10-CM

## 2020-07-22 DIAGNOSIS — Z78.9 OTHER SPECIFIED HEALTH STATUS: ICD-10-CM

## 2020-07-22 PROCEDURE — ZZZZZ: CPT

## 2020-07-27 NOTE — PHYSICAL EXAM
[Alert] : alert [Awake] : awake [Soft] : soft [CTAB] : CTAB [RRR, No Murmurs] : RRR, no murmurs [Acute Distress] : no acute distress [Distended] : not distended [Tender] : non tender

## 2020-08-25 ENCOUNTER — OUTPATIENT (OUTPATIENT)
Dept: OUTPATIENT SERVICES | Facility: HOSPITAL | Age: 45
LOS: 1 days | End: 2020-08-25

## 2020-08-25 ENCOUNTER — APPOINTMENT (OUTPATIENT)
Dept: OBGYN | Facility: HOSPITAL | Age: 45
End: 2020-08-25
Payer: MEDICAID

## 2020-08-25 VITALS
DIASTOLIC BLOOD PRESSURE: 67 MMHG | BODY MASS INDEX: 34.53 KG/M2 | HEIGHT: 67 IN | TEMPERATURE: 98.3 F | WEIGHT: 220 LBS | SYSTOLIC BLOOD PRESSURE: 106 MMHG | HEART RATE: 111 BPM

## 2020-08-25 DIAGNOSIS — N17.9 ACUTE KIDNEY FAILURE, UNSPECIFIED: ICD-10-CM

## 2020-08-25 PROCEDURE — 99024 POSTOP FOLLOW-UP VISIT: CPT | Mod: GC

## 2020-08-26 PROBLEM — Z78.9 OTHER SPECIFIED HEALTH STATUS: Chronic | Status: ACTIVE | Noted: 2020-07-07

## 2020-08-26 NOTE — PHYSICAL EXAM
[Normal] : vagina [Labia Majora] : labia major [Labia Minora] : labia minora [No Bleeding] : there was no active vaginal bleeding [Absent] : absent [Uterine Adnexae] : were not tender and not enlarged [Atrophy] : no atrophy [Erythema] : no erythema [Discharge] : no discharge

## 2020-09-01 DIAGNOSIS — N17.9 ACUTE KIDNEY FAILURE, UNSPECIFIED: ICD-10-CM

## 2020-09-01 DIAGNOSIS — Z09 ENCOUNTER FOR FOLLOW-UP EXAMINATION AFTER COMPLETED TREATMENT FOR CONDITIONS OTHER THAN MALIGNANT NEOPLASM: ICD-10-CM

## 2021-05-24 NOTE — H&P ADULT - NSHPREVIEWOFSYSTEMS_GEN_ALL_CORE
115
REVIEW OF SYSTEMS:    CONSTITUTIONAL: No weakness, fevers or chills  EYES/ENT: No visual changes;  No dysphagia  NECK: No pain or stiffness  RESPIRATORY: No cough, wheezing, hemoptysis; No shortness of breath  CARDIOVASCULAR: No chest pain or palpitations; +LE edema  GASTROINTESTINAL: No abdominal or epigastric pain. No nausea, vomiting, or hematemesis; No diarrhea or constipation. No melena or hematochezia.  GENITOURINARY: +frequency, no dysuria or hematuria  NEUROLOGICAL: No numbness or weakness  SKIN: No itching, burning, rashes, or lesions

## 2021-09-29 ENCOUNTER — OUTPATIENT (OUTPATIENT)
Dept: OUTPATIENT SERVICES | Facility: HOSPITAL | Age: 46
LOS: 1 days | End: 2021-09-29

## 2021-09-29 ENCOUNTER — APPOINTMENT (OUTPATIENT)
Dept: OBGYN | Facility: HOSPITAL | Age: 46
End: 2021-09-29
Payer: MEDICAID

## 2021-09-29 VITALS
TEMPERATURE: 97.9 F | DIASTOLIC BLOOD PRESSURE: 89 MMHG | SYSTOLIC BLOOD PRESSURE: 135 MMHG | WEIGHT: 248 LBS | HEART RATE: 111 BPM | BODY MASS INDEX: 38.84 KG/M2

## 2021-09-29 DIAGNOSIS — Z09 ENCOUNTER FOR FOLLOW-UP EXAMINATION AFTER COMPLETED TREATMENT FOR CONDITIONS OTHER THAN MALIGNANT NEOPLASM: ICD-10-CM

## 2021-09-29 PROCEDURE — 99213 OFFICE O/P EST LOW 20 MIN: CPT | Mod: GE

## 2021-10-05 NOTE — REVIEW OF SYSTEMS
[Fever] : no fever [Chills] : no chills [Fatigue] : no fatigue [Poor Appetite] : appetite not poor [Night Sweats] : no night sweats [Dyspnea] : no dyspnea [Cough] : no cough [Chest Pain] : no chest pain [Abdominal Pain] : no abdominal pain [Constipation] : no constipation [Diarrhea] : diarrhea [Vomiting] : no vomiting [Melena] : no melena [Bloating] : no bloating [Urgency] : no urgency [Frequency] : no frequency [Incontinence] : no incontinence [Urethral Discharge] : no urethral discharge [Abn Vaginal bleeding] : no abnormal vaginal bleeding [CVA Pain] : no CVA pain [Genital Rash/Irritation] : no genital rash/irritation

## 2021-10-05 NOTE — PHYSICAL EXAM
[Examination Of The Breasts] : a normal appearance [No Discharge] : no discharge [No Masses] : no breast masses were palpable [Normal] : normal [Pink Rugae] : pink rugae [No Bleeding] : There was no active vaginal bleeding [Absent] : absent [Uterine Adnexae] : non-palpable [FreeTextEntry4] : no discharge, vaginal cuff intact, no tenderness on bimanual exam

## 2022-02-02 ENCOUNTER — OUTPATIENT (OUTPATIENT)
Dept: OUTPATIENT SERVICES | Facility: HOSPITAL | Age: 47
LOS: 1 days | End: 2022-02-02

## 2022-02-02 ENCOUNTER — APPOINTMENT (OUTPATIENT)
Dept: OBGYN | Facility: HOSPITAL | Age: 47
End: 2022-02-02
Payer: MEDICAID

## 2022-02-02 VITALS
TEMPERATURE: 97.1 F | SYSTOLIC BLOOD PRESSURE: 138 MMHG | BODY MASS INDEX: 38.19 KG/M2 | HEIGHT: 68 IN | WEIGHT: 252 LBS | HEART RATE: 96 BPM | DIASTOLIC BLOOD PRESSURE: 81 MMHG

## 2022-02-02 PROCEDURE — 99213 OFFICE O/P EST LOW 20 MIN: CPT | Mod: GE

## 2022-02-03 DIAGNOSIS — M54.9 DORSALGIA, UNSPECIFIED: ICD-10-CM

## 2022-02-04 NOTE — HISTORY OF PRESENT ILLNESS
[FreeTextEntry1] : 45yo P0 s/p COREY, BS (7/9/20) for fibroid uterus presents for chief concern of lower back and abdominal pain. Patient reports that since her procedure, she has experienced intermittent bilateral lower back pain that radiates to the suprapubic region. Pain also sometimes occurs along site of her midline vertical scar. Pain is rated 8/10 at its worst, and worsens with bending forward and with exertion. Pain improves when laying down and with Tylenol. Patient reports that she has been taking an average of 2 tabs of Tylenol daily. Patient was last seen in clinic complaining of dyspareunia and vaginal itching/burning, but she has been using lubrication and her symptoms have since resolved. She denies any other symptoms, including vaginal discharge, vaginal bleeding, fevers, chills, chest pain, SOB, n/v/d, constipation, dysuria, hematuria, or urinary frequency/urgency.

## 2022-02-04 NOTE — PHYSICAL EXAM
[Appropriately responsive] : appropriately responsive [Alert] : alert [No Acute Distress] : no acute distress [No Lymphadenopathy] : no lymphadenopathy [Regular Rate Rhythm] : regular rate rhythm [No Murmurs] : no murmurs [Clear to Auscultation B/L] : clear to auscultation bilaterally [Soft] : soft [Non-tender] : non-tender [Non-distended] : non-distended [No HSM] : No HSM [No Lesions] : no lesions [No Mass] : no mass [Oriented x3] : oriented x3 [FreeTextEntry7] : No CVAT bilaterally; midline vertical incision healed well, with no surrounding erythema, induration, or palpable fluctuance

## 2022-05-11 ENCOUNTER — APPOINTMENT (OUTPATIENT)
Dept: MAMMOGRAPHY | Facility: IMAGING CENTER | Age: 47
End: 2022-05-11
Payer: MEDICAID

## 2022-05-11 ENCOUNTER — NON-APPOINTMENT (OUTPATIENT)
Age: 47
End: 2022-05-11

## 2022-05-11 ENCOUNTER — RESULT REVIEW (OUTPATIENT)
Age: 47
End: 2022-05-11

## 2022-05-11 ENCOUNTER — OUTPATIENT (OUTPATIENT)
Dept: OUTPATIENT SERVICES | Facility: HOSPITAL | Age: 47
LOS: 1 days | End: 2022-05-11
Payer: COMMERCIAL

## 2022-05-11 DIAGNOSIS — Z00.00 ENCOUNTER FOR GENERAL ADULT MEDICAL EXAMINATION WITHOUT ABNORMAL FINDINGS: ICD-10-CM

## 2022-05-11 PROCEDURE — 77063 BREAST TOMOSYNTHESIS BI: CPT | Mod: 26

## 2022-05-11 PROCEDURE — 77063 BREAST TOMOSYNTHESIS BI: CPT

## 2022-05-11 PROCEDURE — 77067 SCR MAMMO BI INCL CAD: CPT

## 2022-05-11 PROCEDURE — 77067 SCR MAMMO BI INCL CAD: CPT | Mod: 26

## 2022-05-12 ENCOUNTER — TRANSCRIPTION ENCOUNTER (OUTPATIENT)
Age: 47
End: 2022-05-12

## 2022-05-24 ENCOUNTER — APPOINTMENT (OUTPATIENT)
Dept: ULTRASOUND IMAGING | Facility: IMAGING CENTER | Age: 47
End: 2022-05-24

## 2022-08-04 ENCOUNTER — APPOINTMENT (OUTPATIENT)
Dept: ULTRASOUND IMAGING | Facility: IMAGING CENTER | Age: 47
End: 2022-08-04

## 2022-09-15 ENCOUNTER — APPOINTMENT (OUTPATIENT)
Dept: OBGYN | Facility: HOSPITAL | Age: 47
End: 2022-09-15

## 2023-04-27 ENCOUNTER — APPOINTMENT (OUTPATIENT)
Dept: OBGYN | Facility: HOSPITAL | Age: 48
End: 2023-04-27
Payer: MEDICAID

## 2023-04-27 ENCOUNTER — OUTPATIENT (OUTPATIENT)
Dept: OUTPATIENT SERVICES | Facility: HOSPITAL | Age: 48
LOS: 1 days | End: 2023-04-27

## 2023-04-27 ENCOUNTER — RESULT REVIEW (OUTPATIENT)
Age: 48
End: 2023-04-27

## 2023-04-27 VITALS
BODY MASS INDEX: 44.41 KG/M2 | SYSTOLIC BLOOD PRESSURE: 138 MMHG | WEIGHT: 293 LBS | HEART RATE: 90 BPM | DIASTOLIC BLOOD PRESSURE: 69 MMHG | HEIGHT: 68 IN | TEMPERATURE: 97.9 F

## 2023-04-27 VITALS — WEIGHT: 232 LBS | BODY MASS INDEX: 35.28 KG/M2

## 2023-04-27 DIAGNOSIS — Z00.00 ENCOUNTER FOR GENERAL ADULT MEDICAL EXAMINATION W/OUT ABNORMAL FINDINGS: ICD-10-CM

## 2023-04-27 LAB
APPEARANCE UR: CLEAR — SIGNIFICANT CHANGE UP
BILIRUB UR-MCNC: NEGATIVE — SIGNIFICANT CHANGE UP
COLOR SPEC: SIGNIFICANT CHANGE UP
DIFF PNL FLD: NEGATIVE — SIGNIFICANT CHANGE UP
GLUCOSE UR QL: NEGATIVE — SIGNIFICANT CHANGE UP
HIV 1+2 AB+HIV1 P24 AG SERPL QL IA: SIGNIFICANT CHANGE UP
KETONES UR-MCNC: NEGATIVE — SIGNIFICANT CHANGE UP
LEUKOCYTE ESTERASE UR-ACNC: NEGATIVE — SIGNIFICANT CHANGE UP
NITRITE UR-MCNC: NEGATIVE — SIGNIFICANT CHANGE UP
PH UR: 6.5 — SIGNIFICANT CHANGE UP (ref 5–8)
PROT UR-MCNC: NEGATIVE — SIGNIFICANT CHANGE UP
SP GR SPEC: 1.02 — SIGNIFICANT CHANGE UP (ref 1.01–1.05)
UROBILINOGEN FLD QL: SIGNIFICANT CHANGE UP

## 2023-04-27 PROCEDURE — 99213 OFFICE O/P EST LOW 20 MIN: CPT | Mod: GC

## 2023-04-27 NOTE — PHYSICAL EXAM
[Appropriately responsive] : appropriately responsive [Alert] : alert [No Acute Distress] : no acute distress [No Lymphadenopathy] : no lymphadenopathy [Regular Rate Rhythm] : regular rate rhythm [No Murmurs] : no murmurs [Clear to Auscultation B/L] : clear to auscultation bilaterally [Soft] : soft [Non-tender] : non-tender [Non-distended] : non-distended [No HSM] : No HSM [No Lesions] : no lesions [No Mass] : no mass [Oriented x3] : oriented x3 [Examination Of The Breasts] : a normal appearance [No Masses] : no breast masses were palpable [Labia Majora] : normal [Labia Minora] : normal [Normal] : normal [Declined] : Patient declined rectal exam

## 2023-04-28 DIAGNOSIS — L76.82 OTHER POSTPROCEDURAL COMPLICATIONS OF SKIN AND SUBCUTANEOUS TISSUE: ICD-10-CM

## 2023-04-28 LAB
HBV SURFACE AG SER-ACNC: SIGNIFICANT CHANGE UP
HCV RNA SPEC NAA+PROBE-LOG IU: SIGNIFICANT CHANGE UP
HCV RNA SPEC NAA+PROBE-LOG IU: SIGNIFICANT CHANGE UP LOGIU/ML
T PALLIDUM AB TITR SER: NEGATIVE — SIGNIFICANT CHANGE UP

## 2023-04-29 PROBLEM — Z00.00 ENCOUNTER FOR PREVENTIVE HEALTH EXAMINATION: Status: ACTIVE | Noted: 2020-07-20

## 2023-04-29 LAB
CULTURE RESULTS: SIGNIFICANT CHANGE UP
SPECIMEN SOURCE: SIGNIFICANT CHANGE UP

## 2023-04-29 NOTE — DISCUSSION/SUMMARY
[FreeTextEntry1] : 46 year old P0 s/p COREY, BS (7/9/2020) 2/2 fibroid uterus presents to clinic complaining of pain in her incision for one week duration. Physical exam unremarkable. Etiologies for incisional pain include but not limited to muscle strain vs scar tissue\par \par #incisional pain\par -patient counseled on various etiologies mentioned above\par -patient instructed to use heat packs and tylenol PRN\par \par #dysuria\par -urine dipstick negative\par -urine culture sent\par \par #HCM\par -mammogram and breast US referral given\par -GI referral given for colonscopy\par -STI panel done today\par \par discussed w Dr Marrero\par CATHERINE Garner PGY2

## 2023-04-29 NOTE — HISTORY OF PRESENT ILLNESS
[FreeTextEntry1] : 46 year old P0 s/p COREY, SEBAS (7/9/2020) 2/2 fibroid uterus presents to clinic complaining of pain in her incision for one week duration. Patient states that last week she was lifting weight when she noticed mild pain in her incision. Denies needing to take any medications for the pain to resolve. States that the pain resolved on its own. Only concomitant symptoms is dysuria. Denies any increase in urinary frequency. Denies lightheadedness, dizziness, fevers, chills, nausea, vomiting. Patient is tolerating PO and having normal bowel movements. Denies any abnormal vaginal discharge.\par \par OB: MAB x1 s/p D+C\par GYN: +Remote history of fibroids and chlamydia, Denies cysts, abnormal paps\par \par PMHx: EDIE\par Meds: Denies\par Surgeries: SEBAS NICOLE; D+C; Liposuction; Sinusitis

## 2023-05-01 DIAGNOSIS — D21.9 BENIGN NEOPLASM OF CONNECTIVE AND OTHER SOFT TISSUE, UNSPECIFIED: ICD-10-CM

## 2023-05-01 DIAGNOSIS — Z00.00 ENCOUNTER FOR GENERAL ADULT MEDICAL EXAMINATION WITHOUT ABNORMAL FINDINGS: ICD-10-CM

## 2023-08-22 ENCOUNTER — APPOINTMENT (OUTPATIENT)
Dept: ENDOCRINOLOGY | Facility: CLINIC | Age: 48
End: 2023-08-22
Payer: MEDICAID

## 2023-08-22 ENCOUNTER — NON-APPOINTMENT (OUTPATIENT)
Age: 48
End: 2023-08-22

## 2023-08-22 VITALS
HEART RATE: 82 BPM | WEIGHT: 242 LBS | HEIGHT: 68 IN | TEMPERATURE: 97.2 F | BODY MASS INDEX: 36.68 KG/M2 | DIASTOLIC BLOOD PRESSURE: 68 MMHG | RESPIRATION RATE: 17 BRPM | OXYGEN SATURATION: 98 % | SYSTOLIC BLOOD PRESSURE: 130 MMHG

## 2023-08-22 PROCEDURE — 99204 OFFICE O/P NEW MOD 45 MIN: CPT

## 2023-08-22 NOTE — REVIEW OF SYSTEMS
[FreeTextEntry2] : Constitutional: No Fever Eyes: No visual difficulty ENT: has difficulty hearing Cardiovascular: No palpitations Respiratory: No Cough Gastrointestinal: No nausea Genitourinary: No dysuria Musculoskeletal: No joint pain Neurological: No headaches Psychiatry: No sleep abnormalities Skin: No rashes Hematology: No bleeding

## 2023-08-22 NOTE — HISTORY OF PRESENT ILLNESS
[FreeTextEntry1] : Problems: 1. Thyrotoxicosis 2. Autoimmune thyroid disorder 3. Obesity  Thyrotoxicosis/Autoimmune thyroid disorder 1. Diagnosed in 2018 with a suppressed TSH. TPO antibodies were positive in May 2023 2. Labs: 2016 - TSH N 2018 - TSH 0.24 (suppressed) 07/29/2022 - TSH < 0.01 (0.4 to 4.5) 05/30/2023 - TPO antibodies > 600 (0-34) 3. Aunt had unknown thyroid disorder, no family history of thyroid cancer, no personal history of radiation treatment 4. No thyroid eye disease, no Graves' dermopathy 5. Meds: Not on thyroid hormone replacement Not on estrogen  Obesity 1. 08/22/2203 - BMI 36.8  - weight 242 pounds 2. No pancreatitis, no personal or family history of medullary thyroid cancer

## 2023-08-22 NOTE — ASSESSMENT
[FreeTextEntry1] : This patient has a positive TPO antibody and her TSH was suppressed in 2018 and 2022 - I ordered labs to evaluate this and I ordered a thyroid US.  The patient is obese and wishes to use weight loss medications but I advised her that I need to assess her thyroid function first.  Plan: 1. Labs to be done today - see below 2. Thyroid US 3. Follow up in 2 weeks to review results - telehealth visit ok

## 2023-08-22 NOTE — PHYSICAL EXAM
[de-identified] : General: No distress, well nourished Eyes: Normal Sclera, EOMI, PERRL ENT: Normal appearance of the nose, normal oropharynx Neck/Thyroid: No cervical lymphadenopathy, thyroid gland 20 g in size, no thyroid nodules, non-tender Respiratory: No use of accessory muscles of respiration, vesicular breath sounds heard bilaterally, no crepitations or ronchi Cardiovascular: S1 and S2 heard and normal, no S3 or S4, no murmurs, radial pulse normal bilaterally Abdomen: soft, non-tender, no masses, normal bowel sounds Musculoskeletal: No swelling or deformities of joints of hands, no pedal edema Neurological: Normal range of motion in the hands, Normal brachioradialis reflexes bilaterally Psychiatry: Patient converses normally, good judgement and insight Skin: No rashes in hands, no nodules palpated in hands

## 2023-08-23 LAB
ALBUMIN SERPL ELPH-MCNC: 4.7 G/DL
ALP BLD-CCNC: 119 U/L
ALT SERPL-CCNC: 17 U/L
ANION GAP SERPL CALC-SCNC: 16 MMOL/L
AST SERPL-CCNC: 20 U/L
BILIRUB SERPL-MCNC: 0.4 MG/DL
BUN SERPL-MCNC: 12 MG/DL
CALCIUM SERPL-MCNC: 10.2 MG/DL
CHLORIDE SERPL-SCNC: 105 MMOL/L
CO2 SERPL-SCNC: 20 MMOL/L
CREAT SERPL-MCNC: 0.98 MG/DL
EGFR: 72 ML/MIN/1.73M2
ERYTHROCYTE [SEDIMENTATION RATE] IN BLOOD BY WESTERGREN METHOD: 35 MM/HR
GLUCOSE SERPL-MCNC: 92 MG/DL
POTASSIUM SERPL-SCNC: 4.7 MMOL/L
PROT SERPL-MCNC: 7.6 G/DL
SODIUM SERPL-SCNC: 140 MMOL/L
T3 SERPL-MCNC: 75 NG/DL
T4 FREE SERPL-MCNC: 0.9 NG/DL
THYROGLOB AB SERPL-ACNC: 21.9 IU/ML
THYROGLOB SERPL-MCNC: 17.6 NG/ML
THYROPEROXIDASE AB SERPL IA-ACNC: 2767 IU/ML
TSH SERPL-ACNC: 1.87 UIU/ML

## 2023-08-24 ENCOUNTER — APPOINTMENT (OUTPATIENT)
Dept: GASTROENTEROLOGY | Facility: CLINIC | Age: 48
End: 2023-08-24
Payer: MEDICAID

## 2023-08-24 ENCOUNTER — OUTPATIENT (OUTPATIENT)
Dept: OUTPATIENT SERVICES | Facility: HOSPITAL | Age: 48
LOS: 1 days | End: 2023-08-24

## 2023-08-24 VITALS
SYSTOLIC BLOOD PRESSURE: 120 MMHG | OXYGEN SATURATION: 98 % | WEIGHT: 246 LBS | HEART RATE: 75 BPM | BODY MASS INDEX: 37.28 KG/M2 | HEIGHT: 68 IN | DIASTOLIC BLOOD PRESSURE: 80 MMHG

## 2023-08-24 DIAGNOSIS — Z12.11 ENCOUNTER FOR SCREENING FOR MALIGNANT NEOPLASM OF COLON: ICD-10-CM

## 2023-08-24 DIAGNOSIS — K59.00 CONSTIPATION, UNSPECIFIED: ICD-10-CM

## 2023-08-24 DIAGNOSIS — Z86.018 PERSONAL HISTORY OF OTHER BENIGN NEOPLASM: ICD-10-CM

## 2023-08-24 PROCEDURE — 99203 OFFICE O/P NEW LOW 30 MIN: CPT

## 2023-08-24 RX ORDER — POLYETHYLENE GLYCOL 3350 17 G/17G
17 POWDER, FOR SOLUTION ORAL
Qty: 1 | Refills: 0 | Status: ACTIVE | COMMUNITY
Start: 2023-08-24 | End: 1900-01-01

## 2023-08-24 NOTE — HISTORY OF PRESENT ILLNESS
[FreeTextEntry1] : 46 y/o F with hx of hysterectomy 2/2 fibroids and thyrotoxicosis presenting for GI visit for colon cancer screening.   Patient goes to the bathroom every 2-3 days. She denies straining and constipation but has a hx of inflamed external hemorrhoids that were medically treated last year at [facility]. She denies history of black stool and blood in stool. She denies unintentional weight loss. She denies nausea and vomiting. She will occasionally have reflux that is make worse with sauce and oil and made better with Pepcid AC. Sometimes the patient will get some cramping infrequently prior to BM and after she defecates the pain dissipates.  Patient denies family history of cancer or GI issues. She has never smoked tobacco or used illicit drugs. She will drink one glass of red wine occasionally.

## 2023-08-24 NOTE — PHYSICAL EXAM
[Alert] : alert [Normal Voice/Communication] : normal voice/communication [Healthy Appearing] : healthy appearing [No Acute Distress] : no acute distress [Sclera] : the sclera and conjunctiva were normal [Hearing Threshold Finger Rub Not [place]] : hearing was normal [Normal Lips/Gums] : the lips and gums were normal [Oropharynx] : the oropharynx was normal [Normal Appearance] : the appearance of the neck was normal [No Neck Mass] : no neck mass was observed [No Respiratory Distress] : no respiratory distress [No Acc Muscle Use] : no accessory muscle use [Respiration, Rhythm And Depth] : normal respiratory rhythm and effort [Auscultation Breath Sounds / Voice Sounds] : lungs were clear to auscultation bilaterally [Heart Rate And Rhythm] : heart rate was normal and rhythm regular [Normal S1, S2] : normal S1 and S2 [Murmurs] : no murmurs [Bowel Sounds] : normal bowel sounds [Abdomen Tenderness] : non-tender [No Masses] : no abdominal mass palpated [Abdomen Soft] : soft [] : no hepatosplenomegaly [Oriented To Time, Place, And Person] : oriented to person, place, and time

## 2023-08-24 NOTE — ASSESSMENT
[FreeTextEntry1] : 46 y/o F with obesity and hx of hysterectomy 2/2 fibroids and thyrotoxicosis presenting for GI visit for colon cancer screening. She was educated on the risks and benefits of colonoscopy and would like to pursue the procedure. Patient will prep with MiraLAX and Ducolax.   Case discussed with Dr. Viola Coronado  PGY4

## 2023-08-24 NOTE — REASON FOR VISIT
[Initial Evaluation] : an initial evaluation [FreeTextEntry1] : constipation, colon cancer screening

## 2023-08-25 DIAGNOSIS — E66.9 OBESITY, UNSPECIFIED: ICD-10-CM

## 2023-08-25 DIAGNOSIS — Z12.11 ENCOUNTER FOR SCREENING FOR MALIGNANT NEOPLASM OF COLON: ICD-10-CM

## 2023-08-25 DIAGNOSIS — K59.00 CONSTIPATION, UNSPECIFIED: ICD-10-CM

## 2023-08-25 LAB — TSI ACT/NOR SER: 0.99 IU/L

## 2023-08-28 LAB — TSH RECEPTOR AB: <1.1 IU/L

## 2023-09-11 ENCOUNTER — APPOINTMENT (OUTPATIENT)
Dept: ENDOCRINOLOGY | Facility: CLINIC | Age: 48
End: 2023-09-11
Payer: MEDICAID

## 2023-09-11 DIAGNOSIS — E66.9 OBESITY, UNSPECIFIED: ICD-10-CM

## 2023-09-11 DIAGNOSIS — E05.90 THYROTOXICOSIS, UNSPECIFIED W/OUT THYROTOXIC CRISIS OR STORM: ICD-10-CM

## 2023-09-11 DIAGNOSIS — R76.8 OTHER SPECIFIED ABNORMAL IMMUNOLOGICAL FINDINGS IN SERUM: ICD-10-CM

## 2023-09-11 PROCEDURE — 99214 OFFICE O/P EST MOD 30 MIN: CPT | Mod: 95

## 2023-11-02 ENCOUNTER — APPOINTMENT (OUTPATIENT)
Dept: OBGYN | Facility: HOSPITAL | Age: 48
End: 2023-11-02

## 2023-11-03 ENCOUNTER — APPOINTMENT (OUTPATIENT)
Dept: GASTROENTEROLOGY | Facility: HOSPITAL | Age: 48
End: 2023-11-03

## 2023-12-11 ENCOUNTER — APPOINTMENT (OUTPATIENT)
Dept: ENDOCRINOLOGY | Facility: CLINIC | Age: 48
End: 2023-12-11

## 2024-02-06 ENCOUNTER — OUTPATIENT (OUTPATIENT)
Dept: OUTPATIENT SERVICES | Facility: HOSPITAL | Age: 49
LOS: 1 days | End: 2024-02-06

## 2024-02-06 ENCOUNTER — APPOINTMENT (OUTPATIENT)
Dept: OBGYN | Facility: HOSPITAL | Age: 49
End: 2024-02-06
Payer: MEDICAID

## 2024-02-06 VITALS
DIASTOLIC BLOOD PRESSURE: 90 MMHG | WEIGHT: 258 LBS | BODY MASS INDEX: 39.1 KG/M2 | SYSTOLIC BLOOD PRESSURE: 142 MMHG | HEIGHT: 68 IN | TEMPERATURE: 97.8 F | HEART RATE: 100 BPM

## 2024-02-06 DIAGNOSIS — N94.10 UNSPECIFIED DYSPAREUNIA: ICD-10-CM

## 2024-02-06 PROCEDURE — 99213 OFFICE O/P EST LOW 20 MIN: CPT | Mod: GE

## 2024-02-06 RX ORDER — CONJUGATED ESTROGENS 0.62 MG/G
0.62 CREAM VAGINAL
Qty: 1 | Refills: 2 | Status: ACTIVE | COMMUNITY
Start: 2024-02-06 | End: 1900-01-01

## 2024-02-07 DIAGNOSIS — N94.10 UNSPECIFIED DYSPAREUNIA: ICD-10-CM

## 2024-02-13 NOTE — PLAN
[FreeTextEntry1] : 48 year old P0 LMP 2020 (s/p COREY, BS (7/9/2020) 2/2 fibroid uterus) presenting for evaluation of vaginal pain with intercourse. Likely secondary to vaginal atrophy as patient is perimenopausal.   #Dysparuneia  - premarin cream rx sent to pharmacy. use instructions reviewed - discussed lubricant  #HCM - colonoscopy per PCP, counseled on importance - mammogram per PCP most recently BIRAD 0 1/9/24. Recommend pt reach out to PCP to see what additional imaging is requested by the radiologist.   Kelsi Aragon, PGY3 d/w Dr. Montalvo

## 2024-02-13 NOTE — PHYSICAL EXAM
[Chaperone Present] : A chaperone was present in the examining room during all aspects of the physical examination [Appropriately responsive] : appropriately responsive [Alert] : alert [No Acute Distress] : no acute distress [Soft] : soft [Non-tender] : non-tender [Non-distended] : non-distended [Oriented x3] : oriented x3 [Labia Majora] : normal [Labia Minora] : normal [Normal] : normal [Atrophy] : atrophy [Absent] : absent [Uterine Adnexae] : non-palpable [FreeTextEntry6] : surgically absent uterus

## 2024-02-13 NOTE — HISTORY OF PRESENT ILLNESS
[FreeTextEntry1] : 48 year old P0 LMP 2020 (s/p SEBAS NICOLE (7/9/2020) 2/2 fibroid uterus) presenting for evaluation of vaginal pain with intercourse. Pt states she is sexually active with her one male partner and is having pain with penetration, especially deep penetration.   Denies bowel/bladder issues. Denies any abnormal vaginal discharge. Denies vaginal bleeding.  Mammo: via PCP, had mammo out of [facility] radiology 1/9/24, BIRAD 0. Pt to follow up with PCP regarding repeat/further imaging requested.  Colonoscopy: never, PCP discussed coleguard with her as she is hesistant about colonoscopy  OB: MAB x1 s/p D+C GYN: +Remote history of fibroids and chlamydia, Denies cysts, abnormal paps PMHx: EDIE Surg: SEBAS NICOLE, liposuction, sinusitis  Meds: Denies

## 2024-04-10 ENCOUNTER — APPOINTMENT (OUTPATIENT)
Dept: ORTHOPEDIC SURGERY | Facility: HOSPITAL | Age: 49
End: 2024-04-10

## 2024-04-10 ENCOUNTER — OUTPATIENT (OUTPATIENT)
Dept: OUTPATIENT SERVICES | Facility: HOSPITAL | Age: 49
LOS: 1 days | End: 2024-04-10
Payer: MEDICAID

## 2024-04-10 ENCOUNTER — APPOINTMENT (OUTPATIENT)
Dept: RADIOLOGY | Facility: HOSPITAL | Age: 49
End: 2024-04-10

## 2024-04-10 ENCOUNTER — RESULT REVIEW (OUTPATIENT)
Age: 49
End: 2024-04-10

## 2024-04-10 VITALS
TEMPERATURE: 97 F | HEART RATE: 86 BPM | DIASTOLIC BLOOD PRESSURE: 51 MMHG | HEIGHT: 67 IN | BODY MASS INDEX: 40.97 KG/M2 | WEIGHT: 261 LBS | SYSTOLIC BLOOD PRESSURE: 131 MMHG

## 2024-04-10 DIAGNOSIS — M54.50 LOW BACK PAIN, UNSPECIFIED: ICD-10-CM

## 2024-04-10 PROCEDURE — 72100 X-RAY EXAM L-S SPINE 2/3 VWS: CPT | Mod: 26

## 2024-04-10 NOTE — DISCUSSION/SUMMARY
[de-identified] : Discussed with patient nature of condition, potential course / sequelae, options reviewed.  Physical therapy / rehabilitation and associated modalities, HEP, radiographs lumbar spine.  Return to office in 6 - 8 weeks / PRN, all questions answered.

## 2024-04-10 NOTE — HISTORY OF PRESENT ILLNESS
[FreeTextEntry1] : Reports chronic low back pain for approximately 3 years, occasional paresthesia R LE.  Denies antecedent trauma nor additional musculoskeletal complaints.  PSH include COREY (2020).

## 2024-04-11 DIAGNOSIS — M54.50 LOW BACK PAIN, UNSPECIFIED: ICD-10-CM

## 2024-06-12 ENCOUNTER — APPOINTMENT (OUTPATIENT)
Dept: ORTHOPEDIC SURGERY | Facility: HOSPITAL | Age: 49
End: 2024-06-12

## 2025-03-26 ENCOUNTER — APPOINTMENT (OUTPATIENT)
Dept: OBGYN | Facility: HOSPITAL | Age: 50
End: 2025-03-26
Payer: MEDICAID

## 2025-03-26 ENCOUNTER — OUTPATIENT (OUTPATIENT)
Dept: OUTPATIENT SERVICES | Facility: HOSPITAL | Age: 50
LOS: 1 days | End: 2025-03-26

## 2025-03-26 VITALS
BODY MASS INDEX: 41.12 KG/M2 | DIASTOLIC BLOOD PRESSURE: 94 MMHG | TEMPERATURE: 97.5 F | SYSTOLIC BLOOD PRESSURE: 138 MMHG | HEART RATE: 102 BPM | WEIGHT: 262 LBS | HEIGHT: 67 IN

## 2025-03-26 DIAGNOSIS — N95.1 MENOPAUSAL AND FEMALE CLIMACTERIC STATES: ICD-10-CM

## 2025-03-26 PROCEDURE — 99213 OFFICE O/P EST LOW 20 MIN: CPT | Mod: GE

## 2025-03-26 RX ORDER — ESTRADIOL 1 MG/1
1 TABLET ORAL DAILY
Qty: 30 | Refills: 5 | Status: ACTIVE | COMMUNITY
Start: 2025-03-26 | End: 1900-01-01

## 2025-03-27 DIAGNOSIS — N95.1 MENOPAUSAL AND FEMALE CLIMACTERIC STATES: ICD-10-CM
